# Patient Record
Sex: FEMALE | Race: OTHER | HISPANIC OR LATINO
[De-identification: names, ages, dates, MRNs, and addresses within clinical notes are randomized per-mention and may not be internally consistent; named-entity substitution may affect disease eponyms.]

---

## 2020-05-19 DIAGNOSIS — Z78.9 OTHER SPECIFIED HEALTH STATUS: ICD-10-CM

## 2020-05-20 ENCOUNTER — APPOINTMENT (OUTPATIENT)
Dept: BREAST CENTER | Facility: CLINIC | Age: 55
End: 2020-05-20
Payer: COMMERCIAL

## 2020-05-20 ENCOUNTER — APPOINTMENT (OUTPATIENT)
Dept: HEMATOLOGY ONCOLOGY | Facility: CLINIC | Age: 55
End: 2020-05-20
Payer: COMMERCIAL

## 2020-05-20 VITALS
BODY MASS INDEX: 25.23 KG/M2 | SYSTOLIC BLOOD PRESSURE: 122 MMHG | HEIGHT: 66 IN | WEIGHT: 157 LBS | OXYGEN SATURATION: 99 % | DIASTOLIC BLOOD PRESSURE: 81 MMHG | HEART RATE: 67 BPM | TEMPERATURE: 98.5 F | RESPIRATION RATE: 18 BRPM

## 2020-05-20 VITALS
SYSTOLIC BLOOD PRESSURE: 115 MMHG | DIASTOLIC BLOOD PRESSURE: 75 MMHG | WEIGHT: 154 LBS | BODY MASS INDEX: 24.75 KG/M2 | HEIGHT: 66 IN | HEART RATE: 75 BPM

## 2020-05-20 DIAGNOSIS — Z00.00 ENCOUNTER FOR GENERAL ADULT MEDICAL EXAMINATION W/OUT ABNORMAL FINDINGS: ICD-10-CM

## 2020-05-20 PROCEDURE — 99245 OFF/OP CONSLTJ NEW/EST HI 55: CPT

## 2020-05-20 PROCEDURE — 99205 OFFICE O/P NEW HI 60 MIN: CPT

## 2020-05-20 NOTE — CONSULT LETTER
[Dear  ___] : Dear  [unfilled], [( Thank you for referring [unfilled] for consultation for _____ )] : Thank you for referring [unfilled] for consultation for [unfilled] [Please see my note below.] : Please see my note below. [Consult Closing:] : Thank you very much for allowing me to participate in the care of this patient.  If you have any questions, please do not hesitate to contact me. [Sincerely,] : Sincerely, [FreeTextEntry3] : Alma Cottrell MS DO\par Breast Surgeon\par [facility] \[place] [address]\par  [DrKaren  ___] : Dr. PHAN

## 2020-05-20 NOTE — PHYSICAL EXAM
[Fully active, able to carry on all pre-disease performance without restriction] : Status 0 - Fully active, able to carry on all pre-disease performance without restriction [Normal] : affect appropriate [de-identified] : 3x3 cm painful mass in right breast in outer mid quadrant

## 2020-05-20 NOTE — PHYSICAL EXAM
[Normocephalic] : normocephalic [Atraumatic] : atraumatic [Supple] : supple [No Supraclavicular Adenopathy] : no supraclavicular adenopathy [Examined in the supine and seated position] : examined in the supine and seated position [No dominant masses] : no dominant masses left breast [No Nipple Retraction] : no left nipple retraction [No Nipple Discharge] : no left nipple discharge [No Axillary Lymphadenopathy] : no left axillary lymphadenopathy [No Edema] : no edema [No Rashes] : no rashes [No Ulceration] : no ulceration [Symmetrical] : symmetrical [Grade 2] : Ptosis Grade 2 [de-identified] : implant soft, in place, 9:00 in area of known malignancy there is a 2-3 cm firm mass, mobile, shiny skin texture but no skin involvement, superficial tumor, healed periareolar incision [de-identified] : implant soft, in place , no masses, no skin changes

## 2020-05-20 NOTE — HISTORY OF PRESENT ILLNESS
[FreeTextEntry1] : This is a 55 year old female referred by Dr. Shen for a newly diagnosed right breast cancer. She states she felt a lump since November 2019. It was painful with pressure.\par Her last imaging was 2 years ago in [place] and she doesn't remember where.She started garlic, oregano supplements, essro, vitamin e and super primose supplements since her diagnosis. She has bilateral retropectoral silicone implants which were placed in [place] 2011. \par \par On February 27, 2020 she presented for imaging for the right breast mass. In the right breast 9:00 a 2.6 cm macro lobulated hypoechoic nodule was seen which correlated with the palpable lump. Cystocele the left breast. On May 5, 2020 she presented for right breast core biopsy and right breast 9:00 6 cm (twirl clip) from the nipple revealed a infiltrating duct carcinoma grade 2 with necrosis and papillary features, estrogen 0%-negative, progesterone 0%-negative, HER-2-negative by FISH and IHC, Ki-67 90%. \par \par She does NOT perform SBE. \par She has not noticed a change in her breast or a breast lump on the left. She feels a right breast mass as above since november.\par She has not noticed a change in her nipple or nipple area.\par She has not noticed a change in the skin of the breast.\par She is not experiencing nipple discharge.\par She is experiencing right breast pain since her bx.\par She has not noticed a lump or lymph node under the armpit. \par \par BREAST CANCER RISK FACTORS\par Menarche: 17\par Date of LMP: 2013\par Menopause: post age 48\par Grav: 4     Para: 4\par Age at first live birth: 21\par Nursed: yes\par Hysterectomy: no\par Oophorectomy: no\par OCP: yes in the past \par HRT: no\par Last pap/pelvic exam: 2019 WNL \par Related family history: none\par Ashkenazi: no \par Mastery risk assessment: n/a\par BRCA testing: \par Bra size: \par \par Last mammogram: 2/27/2020                Location:  [facility]\par Report reviewed.                                 Images reviewed on PACS\par Results: BIRADS 4\par heterogeneously dense breasts. no evidence of malignancy.\par \par Last ultrasound: 2/272020                    Location: [facility]\par Report reviewed.                                 Images reviewed on PACS\par Results: BIRADS 4\par in the 9:00 right breast, 6 cm from the nipple, there is a 2.6cm microlobulated hypoechoic nodule with enhanced through transmission. internal vascularity is seen. this correlates with the palpable lump. in the 3:00 left breast, 3cm from the nipple there is a 3mm simple cyst. in the 10:00 left breast 2 cm from the nipple, there is a 6mm cluster of cysts. in the retroareolar left breast, there is a 3mm simple cyst. no other suspicious nodules or foci shadowing are identified.\par \par Last MRI:   never                                          Location:\par Report reviewed.\par \par

## 2020-05-20 NOTE — HISTORY OF PRESENT ILLNESS
[T: ___] : T[unfilled] [N: ___] : N[unfilled] [2 - Distress Level] : Distress Level: 2 [de-identified] : Patient feels well, good energy , denies any  sob or cough \par + back pain but this is chronic \par + breast pain continues  [de-identified] : This is a 56 y/o woman who is otherwise in excellent health, first noticed a "lump" in her breast back in november.  The mass has waxed and waned in size since then. \par The mass has become more painful,but no skin changes or nipple dc. Her last mammo was about 2 years ago. \par Mammogram done in feb 2020 showed a 2.6 cm mass.  Her diagnosis and biopsy was delayed due to covid. Biopsy done  may 5th showed a high grade triple negative ki 67 was 90 %. \par Patient has not had mri of breast or any systemic imaging or genetics \par Saw breast surgeon dr kumar earlier today. \par \par

## 2020-05-20 NOTE — ASSESSMENT
[FreeTextEntry1] : The pathology and imaging results were reviewed and discussed. She is a stage IIB right breast cancer (T2N0) triple negative(AJCC 8thed).\par \par Breast MRI is recommended for further evaluation of the extent of disease/possible presence of multicentric and/or contralateral disease. \par \par The use of multimodality therapy for the treatment of breast cancer was discussed.  \par Surgical options were reviewed including a lumpectomy to negative margins, with whole breast radiation therapy versus a mastectomy with or without reconstruction. Included in this discussion with the results of the NSABP-04 and 06 trials.\par \par  Mastectomy techniques were discussed in detail including skin sparing and nipple sparing techniques. Recurrence was reviewed and that mastectomy does not confer a 100% risk reduction nor guarantee against breast cancer in the future.\par \par Reconstructive options were briefly reviewed, including implant based versus tissue flap based reconstruction options.  Referral  to a plastic surgeon was offered.  The patient was informed that breast reconstruction is covered by insurance per state and federal laws.   \par \par Axillary staging was discussed. We reviewed the sentinel lymph node procedure, and how if the sentinel lymph node is found to have metastatic disease either on the intraoperative evaluation or on the final pathology, that an axillary dissection of the remaining lymph nodes may be recommended. It was explained that an axillary dissection takes "level one and level two" lymph nodes, and also "level three" if they feel clinically suspicious. It was explained that if the sentinel lymph node was not able to be found, that an axillary dissection may be necessary.  I reviewed the risks of lymphedema for SLND (6%) versus ALND (20%). I reviewed our lymphedema monitoring program.\par \par The Z-0011 study was touched upon, outlining that there is evidence that it may not be necessary to complete an axillary dissection in select patients even if one or two sentinel nodes are positive for cancer, as long as the cancer in the nodes is confined to within the nodes, the nodes aren't  matted down, and if the cancer meets certain criteria including being less than 5 cm, treated by lumpectomy and conventional whole breast radiation, and the patient is planning to take recommended adjuvant therapy, and didn't require preoperative chemotherapy.   \par \par The general indications for the use of adjuvant hormonal and chemotherapy and targeted therapies were discussed. It was explained that medical treatments are dependent on pathology results including receptor studies.  \par \par The indications for radiation therapy and side effects were also discussed including the use varying lengths of whole breast radiation.  It was explained that radiation is generally reserved for lumpectomy patients, and patients with larger tumors or positive lymph nodes. Common side effects were reviewed. \par \par The genetics of breast cancer were discussed with the implications for risk of contralateral cancer and other associated cancers, implication for ovarian risk, options for management, and implications for family members.\par She will get genetic testing today with Dr. Anderson.\par She met with our cancer navigator and was given a cancer binder.\par I will call her after MRI, 2nd op path and tumor board review.\par She knows to call or return sooner should any concerns or questions arise.\par Translation via language Line [employer]

## 2020-05-20 NOTE — ASSESSMENT
[FreeTextEntry1] : This is a 54 y/o woman with  new diagnosis of triple negative , high grade, breast cancer atleast T2Nx. \par \par 1) breast cancer \par -reviewed pathology and imaging \par -explained that the tumor is triple negative , meaning there is no option for targeted therapy and also this is a high grade tumor which is more aggressive and more likely to metastasize traditionally a worse prognosis \par -chemotherapy is indicated in adjuvant or neoadjuvant setting \par -there are several advantages to neoadjuvant chemotherapy in this setting - to assess in vivo sensativity , higher rate of negative margins, early systemic therapy to treat micro mets ,  and the ability to use xeloda in the event there is not a complete response \par -given the tumor is triple negative and well over 2 cm , I would recommend neoadjuvant chemo \par -discussed the need for genetic testing \par -patient will discuss surgical options with dr kumar \par -await mri of breasts \par -petct now to rule out mets \par -needs port and labs today , including markers \par -discussed AC--T , at length, reviewed the rationale , efficacy, and side effects at length \par including but not limited to - hair loss, nausea , constipation , diarrhea , fatigue, bone pain, cytopenias, infection , heart failure and secondary leukemias\par -if brca pos will do carboplatin as well with the taxol \par -discussed dignicap \par -discussed medical marijuana \par -plan for full antiemetic support and gcsf \par \par 2) cardiomyopathy \par get echo  before adriamycin \par \par 3) back pain \par get petct rule out mets \par \par follow up with petct results \par \par dw patient and son (vanessa) , discussed via  [place]ese intepreter \par time spent over 1.5 hour

## 2020-05-29 ENCOUNTER — APPOINTMENT (OUTPATIENT)
Dept: HEMATOLOGY ONCOLOGY | Facility: CLINIC | Age: 55
End: 2020-05-29
Payer: COMMERCIAL

## 2020-05-29 VITALS
HEIGHT: 66 IN | WEIGHT: 156 LBS | OXYGEN SATURATION: 98 % | DIASTOLIC BLOOD PRESSURE: 72 MMHG | BODY MASS INDEX: 25.07 KG/M2 | HEART RATE: 73 BPM | RESPIRATION RATE: 18 BRPM | SYSTOLIC BLOOD PRESSURE: 109 MMHG | TEMPERATURE: 98.2 F

## 2020-05-29 PROCEDURE — 99215 OFFICE O/P EST HI 40 MIN: CPT

## 2020-05-30 NOTE — PHYSICAL EXAM
[Fully active, able to carry on all pre-disease performance without restriction] : Status 0 - Fully active, able to carry on all pre-disease performance without restriction [Normal] : affect appropriate [de-identified] : 3x3 cm painful mass in right breast in outer mid quadrant

## 2020-05-30 NOTE — REASON FOR VISIT
[Follow-Up Visit] : a follow-up [FreeTextEntry2] : triple negative breast cancer here to review treatment plan

## 2020-05-30 NOTE — HISTORY OF PRESENT ILLNESS
[N: ___] : N[unfilled] [T: ___] : T[unfilled] [2 - Distress Level] : Distress Level: 2 [de-identified] : This is a 54 y/o woman who is otherwise in excellent health, first noticed a "lump" in her breast back in november.  The mass has waxed and waned in size since then. \par The mass has become more painful,but no skin changes or nipple dc. Her last mammo was about 2 years ago. \par Mammogram done in feb 2020 showed a 2.6 cm mass.  Her diagnosis and biopsy was delayed due to covid. Biopsy done  may 5th showed a high grade triple negative ki 67 was 90 %. \par Patient has not had mri of breast or any systemic imaging or genetics \par Saw breast surgeon dr kumar and MRI as well as neoadjuvant chemo recommended \par \par  [de-identified] : + breast pain \par hasn’t done petct yet , going for port and covid testing next week \par echo done may 2020 [facility] - normal EF \par mri of breast shows additional areas suspicious , going for ultrasound and bx  , if unable to see on ultrasound will need MRI guided bx \par 5/2020- markers negative, d is only 14, ferritin was 188, folate 14.7, b12 274\par cmp normal \par wants to do medical cannibis

## 2020-05-30 NOTE — ASSESSMENT
[FreeTextEntry1] : This is a 56 y/o woman with  new diagnosis of triple negative , high grade, breast cancer atleast T2Nx. \par \par 1) breast cancer \par -mri reviewed with dr berger, patient needs additional biopsy either by ultrasound if possible or by mri \par -port will be placed on monday \par -echo is normal \par -await petct \par - again reviewed dose dense AC--T , at length, explained rationale , efficacy, and side effects at length \par including but not limited to - hair loss, nausea , constipation , diarrhea , fatigue, bone pain, cytopenias, infection , heart failure and secondary leukemias\par -if brca pos will do carboplatin as well with the taxol \par -await genetic testing \par -covid testing before chemo and port \par -reviewed the prescribed antiemetics including zofran and compazine , discussed the need to call with refractory nausea and also constipation \par discussed the need to call with fever \par \par 2) cardiomyopathy \par echo is normal \par discused risk of cardiac failure \par \par 3) back pain \par petct pending \par \par 4) vit d def \par start vit d 2000 units a day \par \par 5) vit b12 def \par start b12 1000 \par \par dw patient and son (vanessa) , 5 0

## 2020-06-02 ENCOUNTER — APPOINTMENT (OUTPATIENT)
Dept: THORACIC SURGERY | Facility: HOSPITAL | Age: 55
End: 2020-06-02

## 2020-06-09 ENCOUNTER — APPOINTMENT (OUTPATIENT)
Dept: HEMATOLOGY ONCOLOGY | Facility: CLINIC | Age: 55
End: 2020-06-09
Payer: COMMERCIAL

## 2020-06-12 ENCOUNTER — APPOINTMENT (OUTPATIENT)
Dept: HEMATOLOGY ONCOLOGY | Facility: CLINIC | Age: 55
End: 2020-06-12
Payer: COMMERCIAL

## 2020-06-12 VITALS
WEIGHT: 151 LBS | DIASTOLIC BLOOD PRESSURE: 77 MMHG | HEIGHT: 66 IN | BODY MASS INDEX: 24.27 KG/M2 | OXYGEN SATURATION: 98 % | TEMPERATURE: 98.6 F | RESPIRATION RATE: 18 BRPM | HEART RATE: 85 BPM | SYSTOLIC BLOOD PRESSURE: 103 MMHG

## 2020-06-12 PROCEDURE — 99215 OFFICE O/P EST HI 40 MIN: CPT

## 2020-06-12 NOTE — ASSESSMENT
[FreeTextEntry1] : This is a 56 y/o woman with  new diagnosis of triple negative , high grade, breast cancer atleast T2Nx. \par \par 1) breast cancer \par -started neoadjuvant ddAC\par -overall tolerating well \par -discussed nausea , advised more aggressive use of prophylacitic antimetics \par -responding well to chemo \par -brca positive dw patient and son at length , will add carbo to taxol \par advised grover bso for brca positive , send ca 125 , will refer to gyn onc \par advised testing of siblings and children \par -decreased dose of chemo with next cycle due to severe side effects \par -labs today \par \par 2) cardiomyopathy \par echo is normal \par discused risk of cardiac failure \par \par 3) back pain \par petct neg \par \par 4) vit d def \par cont vit d 2000 units a day \par \par 5) vit b12 def \par cont  b12 1000 \par \par 6) nausea \par do zofran twice a day to prevent nausea \par starrt olanzepim for 3 days after chemo \par start ativan prn \par start marijuana for nausea as well \par \par 7) constipation \par cont senna start day before chemo \par \par \par \par dw patient and son (vanessa)  at length

## 2020-06-12 NOTE — REVIEW OF SYSTEMS
[Fatigue] : fatigue [Vomiting] : vomiting [Constipation] : constipation [FreeTextEntry9] : back pain  stable

## 2020-06-12 NOTE — HISTORY OF PRESENT ILLNESS
[de-identified] : This is a 54 y/o woman who is otherwise in excellent health, first noticed a "lump" in her breast back in november.  The mass has waxed and waned in size since then. \par The mass has become more painful,but no skin changes or nipple dc. Her last mammo was about 2 years ago. \par Mammogram done in feb 2020 showed a 2.6 cm mass.  Her diagnosis and biopsy was delayed due to covid. Biopsy done  may 5th showed a high grade triple negative ki 67 was 90 %. \par Patient has not had mri of breast or any systemic imaging or genetics \par Saw breast surgeon dr kumar and MRI as well as neoadjuvant chemo recommended \par \par Started ddAC  june 5, 2020 \par \par BRCA1 positve\par PETCT negative for mets \par echo is normal may 2020 \par \par biopsy of contralat breast is positive for triple negative breast \par \par \par  [de-identified] : started AC chemo last week , had severe nausea and vomtiing , also was extremely tired and didn’t want to get out of bed \par compazine did not help but zofran helped and caused headche \par also felt a little dizzy and light headed \par no fevers \par + constipation \par no bone pain \par + gerd

## 2020-06-12 NOTE — REASON FOR VISIT
[FreeTextEntry2] : triple negative breast cancer presents for toxicity check and clearence for chemo

## 2020-06-12 NOTE — PHYSICAL EXAM
[Restricted in physically strenuous activity but ambulatory and able to carry out work of a light or sedentary nature] : Status 1- Restricted in physically strenuous activity but ambulatory and able to carry out work of a light or sedentary nature, e.g., light house work, office work [de-identified] : 1.5 to 2 cm  l mass in right breast in outer mid quadrant , sig smaller compared to last viist

## 2020-06-26 ENCOUNTER — APPOINTMENT (OUTPATIENT)
Dept: HEMATOLOGY ONCOLOGY | Facility: CLINIC | Age: 55
End: 2020-06-26
Payer: MEDICAID

## 2020-06-26 VITALS
SYSTOLIC BLOOD PRESSURE: 107 MMHG | BODY MASS INDEX: 23.27 KG/M2 | HEIGHT: 66 IN | OXYGEN SATURATION: 100 % | WEIGHT: 144.8 LBS | HEART RATE: 109 BPM | RESPIRATION RATE: 17 BRPM | DIASTOLIC BLOOD PRESSURE: 67 MMHG

## 2020-06-26 PROCEDURE — 99214 OFFICE O/P EST MOD 30 MIN: CPT

## 2020-06-26 NOTE — PHYSICAL EXAM
[Restricted in physically strenuous activity but ambulatory and able to carry out work of a light or sedentary nature] : Status 1- Restricted in physically strenuous activity but ambulatory and able to carry out work of a light or sedentary nature, e.g., light house work, office work [Normal] : affect appropriate [de-identified] : mass is sig better measuring about 1.5 cm much flatter and smaller overall

## 2020-06-26 NOTE — HISTORY OF PRESENT ILLNESS
[T: ___] : T[unfilled] [N: ___] : N[unfilled] [de-identified] : This is a 54 y/o woman who is otherwise in excellent health, first noticed a "lump" in her breast back in november.  The mass has waxed and waned in size since then. \par The mass has become more painful,but no skin changes or nipple dc. Her last mammo was about 2 years ago. \par Mammogram done in feb 2020 showed a 2.6 cm mass.  Her diagnosis and biopsy was delayed due to covid. Biopsy done  may 5th showed a high grade triple negative ki 67 was 90 %. \par Patient has not had mri of breast or any systemic imaging or genetics \par Saw breast surgeon dr kumar and MRI as well as neoadjuvant chemo recommended \par \par Started ddAC  june 5, 2020 \par \par BRCA1 positve\par PETCT negative for mets \par echo is normal may 2020 \par \par biopsy of contralat breast is positive for triple negative breast \par \par  [de-identified] : feeling very tired and  nauseated.  better with dose 2 reduction \par zofran  every day, in between nothing \par very fatigued , needs to lay down \par less vomiting  but still having  a lot fatigue \par + mouth sore , needs mouth wash. \par improved mass \par constipation better this time \par chest pain better \par no pain in bones \par today eating better this time \par  [4 - Distress Level] : Distress Level: 4

## 2020-06-26 NOTE — ASSESSMENT
[FreeTextEntry1] : This is a 56 y/o woman with  new diagnosis of triple negative , high grade, breast cancer atleast T2Nx. \par \par 1) breast cancer \par -started neoadjuvant ddAC\par -due for dose 3 next week but patient wants to delay to following week (3 weeks instead of 2 bc of the holiday) \par -overall tolerating well \par -mass responding well to chemo \par -repeat labs today \par -cont antiemetics \par -proceed with cycle 3 of AC \par \par 2) cardiomyopathy \par echo is normal \par discused risk of cardiac failure \par \par 3) back pain \par petct neg \par \par 4) vit d def \par cont vit d 2000 units a day \par \par 5) vit b12 def \par cont  b12 1000 \par \par 6) nausea \par cont preventative zofran working well \par has ativan, compazine , olonzepine, and medical cannibis \par \par 7) constipation \par senna working well \par \par \par \par dw patient and son (vanessa)  at length \par follow up in 2 weeks for chemo \par 3 weeks md appt

## 2020-06-26 NOTE — REVIEW OF SYSTEMS
[Fatigue] : fatigue [Constipation] : constipation [Negative] : Psychiatric [FreeTextEntry4] : mouth sore  [FreeTextEntry7] : nausea

## 2020-07-17 ENCOUNTER — APPOINTMENT (OUTPATIENT)
Dept: HEMATOLOGY ONCOLOGY | Facility: CLINIC | Age: 55
End: 2020-07-17
Payer: MEDICAID

## 2020-07-17 VITALS
WEIGHT: 147 LBS | HEIGHT: 66 IN | SYSTOLIC BLOOD PRESSURE: 91 MMHG | HEART RATE: 96 BPM | TEMPERATURE: 98.3 F | OXYGEN SATURATION: 99 % | RESPIRATION RATE: 18 BRPM | BODY MASS INDEX: 23.63 KG/M2 | DIASTOLIC BLOOD PRESSURE: 64 MMHG

## 2020-07-17 PROCEDURE — 99214 OFFICE O/P EST MOD 30 MIN: CPT

## 2020-07-17 NOTE — ASSESSMENT
[FreeTextEntry1] : This is a 54 y/o woman with  new diagnosis of triple negative , high grade, breast cancer atleast T2Nx. \par \par 1) breast cancer \par -s/p 3 cycles of AC neoadjuvant chemo \par -overall tolerating well \par -responding well to chemo \par -repeat labs today \par -cont antiemetics \par -proceed with cycle 4 out of 4  of AC\par -discussed next chemotherapy with carbo  and taxol, reviewed schedule and risk including - n/v/, d/c, neuropathy and cytopenias  \par \par 2) cardiomyopathy \par echo is normal \par risk of cardiac failure  with AC, no evidence of heart issues \par \par 3) back pain \par petct neg \par \par 4) vit d def \par cont vit d 2000 units a day \par \par 5) vit b12 def \par cont  b12 1000 \par \par 6) nausea \par cont preventative zofran working well \par has ativan, compazine , olonzepine, and medical cannibis \par \par 7) constipation \par cont senna \par \par 8) mouth sores \par cont salt water rinses  \par \par \par \par dw patient and son (vanessa)  at length \par follow up in 1 weeks for chemo \par 2 weeks md appt

## 2020-07-17 NOTE — REASON FOR VISIT
[Follow-Up Visit] : a follow-up [FreeTextEntry2] : breast cancer here for follow up on chemotherapy

## 2020-07-17 NOTE — HISTORY OF PRESENT ILLNESS
[T: ___] : T[unfilled] [N: ___] : N[unfilled] [0 - No Distress] : Distress Level: 0 [de-identified] : This is a 54 y/o woman who is otherwise in excellent health, first noticed a "lump" in her breast back in november.  The mass has waxed and waned in size since then. \par The mass has become more painful,but no skin changes or nipple dc. Her last mammo was about 2 years ago. \par Mammogram done in feb 2020 showed a 2.6 cm mass.  Her diagnosis and biopsy was delayed due to covid. Biopsy done  may 5th showed a high grade triple negative ki 67 was 90 %. \par Patient has not had mri of breast or any systemic imaging or genetics \par Saw breast surgeon dr kumar and MRI as well as neoadjuvant chemo recommended \par \par Started ddAC  june 5, 2020 \par \par BRCA1 positve\par PETCT negative for mets \par echo is normal may 2020 \par \par biopsy of contralat breast is positive for triple negative breast \par \par  [de-identified] : tolerated cycle 3 , of AC\par only vomited once   , taking zofran as needed \par better on weds \par no constipation \par still feeling tired at times , efren with walking long distances with mask on

## 2020-07-17 NOTE — PHYSICAL EXAM
[Restricted in physically strenuous activity but ambulatory and able to carry out work of a light or sedentary nature] : Status 1- Restricted in physically strenuous activity but ambulatory and able to carry out work of a light or sedentary nature, e.g., light house work, office work [Normal] : grossly intact [de-identified] : mass is sig better measuring about 1 cm firm nodule , no adenopathy

## 2020-07-31 ENCOUNTER — APPOINTMENT (OUTPATIENT)
Dept: HEMATOLOGY ONCOLOGY | Facility: CLINIC | Age: 55
End: 2020-07-31
Payer: MEDICAID

## 2020-07-31 VITALS
HEIGHT: 66 IN | BODY MASS INDEX: 23.3 KG/M2 | DIASTOLIC BLOOD PRESSURE: 68 MMHG | OXYGEN SATURATION: 99 % | SYSTOLIC BLOOD PRESSURE: 100 MMHG | HEART RATE: 115 BPM | TEMPERATURE: 98.1 F | WEIGHT: 145 LBS

## 2020-07-31 PROCEDURE — 99214 OFFICE O/P EST MOD 30 MIN: CPT

## 2020-07-31 NOTE — REVIEW OF SYSTEMS
[Fatigue] : fatigue [Vomiting] : vomiting [Negative] : Heme/Lymph [FreeTextEntry2] : severe fatigue  [FreeTextEntry6] : SOB with exertion  [FreeTextEntry7] : severe nausea and vomtiing

## 2020-07-31 NOTE — HISTORY OF PRESENT ILLNESS
[N: ___] : N[unfilled] [T: ___] : T[unfilled] [0 - No Distress] : Distress Level: 0 [de-identified] : This is a 54 y/o woman who is otherwise in excellent health, first noticed a "lump" in her breast back in november.  The mass has waxed and waned in size since then. \par The mass has become more painful,but no skin changes or nipple dc. Her last mammo was about 2 years ago. \par Mammogram done in feb 2020 showed a 2.6 cm mass.  Her diagnosis and biopsy was delayed due to covid. Biopsy done  may 5th showed a high grade triple negative ki 67 was 90 %. \par Patient has not had mri of breast or any systemic imaging or genetics \par Saw breast surgeon dr kumar and MRI as well as neoadjuvant chemo recommended \par \par Started ddAC  june 5, 2020 \par \par BRCA1 positve\par PETCT negative for mets \par echo is normal may 2020 \par \par biopsy of contralat breast is positive for triple negative breast \par \par  [de-identified] : cycle 4 went really poor, felt very nausea and took zofran  multiple times with very little relief \par does not like cannibis \par worsening sore throat \par a lot of fatigue \par no constipation \par can not feel the mass in breast anymore \par \par

## 2020-07-31 NOTE — ASSESSMENT
[FreeTextEntry1] : This is a 54 y/o woman with  new diagnosis of triple negative , high grade, breast cancer atleast T2Nx. \par \par 1) breast cancer \par -s/p 4 cycles of AC neoadjuvant chemo \par -responding extremely well , will have follow up with surgery in 2 weeks \par -slowly recovering from AC , still having a lot of nausea and vomtiing \par -advise liberal use of zofran and compazine , also advise try ativan or olonzapem prescribed earlier \par -advise hydration \par -proceed with next chemo carbo and taxol next week \par -discussed risk of nausea, constipation, neuropathy , allergic reaction cytopenias and infection \par -reminded patient to take steroids night before chemo \par \par 2) cardiomyopathy \par echo is normal \par risk of cardiac failure  with AC, no evidence of heart issues \par \par 3) back pain \par petct neg \par \par 4) vit d def \par cont vit d 2000 units a day \par \par 5) vit b12 def \par cont  b12 1000 \par \par 6) nausea \par cont zofrran \par encouraged trial of  ativan, compazine , olonzepine, and medical cannibis  as needed \par \par 7) constipation \par imrpoved \par \par 8) mouth sores \par cont salt water rinses  \par start magic mouthwash \par \par \par send full panel of labs \par dw patient and son (vanessa)  at length \par follow up in 1 weeks for chemo and md appt to make sure nausea is improved

## 2020-07-31 NOTE — REASON FOR VISIT
[Follow-Up Visit] : a follow-up [FreeTextEntry2] : breast cancer here for follow up on chemotherapy , complains of nauea and vomiting

## 2020-07-31 NOTE — PHYSICAL EXAM
[Restricted in physically strenuous activity but ambulatory and able to carry out work of a light or sedentary nature] : Status 1- Restricted in physically strenuous activity but ambulatory and able to carry out work of a light or sedentary nature, e.g., light house work, office work [Normal] : affect appropriate [de-identified] : unable to feel mass in right breast at all

## 2020-08-07 ENCOUNTER — APPOINTMENT (OUTPATIENT)
Dept: HEMATOLOGY ONCOLOGY | Facility: CLINIC | Age: 55
End: 2020-08-07
Payer: MEDICAID

## 2020-08-07 VITALS
BODY MASS INDEX: 23.89 KG/M2 | SYSTOLIC BLOOD PRESSURE: 103 MMHG | WEIGHT: 148 LBS | DIASTOLIC BLOOD PRESSURE: 70 MMHG | HEART RATE: 121 BPM | TEMPERATURE: 98.5 F | OXYGEN SATURATION: 99 %

## 2020-08-07 PROCEDURE — 99214 OFFICE O/P EST MOD 30 MIN: CPT

## 2020-08-07 NOTE — HISTORY OF PRESENT ILLNESS
[T: ___] : T[unfilled] [N: ___] : N[unfilled] [0 - No Distress] : Distress Level: 0 [de-identified] : This is a 54 y/o woman who is otherwise in excellent health, first noticed a "lump" in her breast back in november.  The mass has waxed and waned in size since then. \par The mass has become more painful,but no skin changes or nipple dc. Her last mammo was about 2 years ago. \par Mammogram done in feb 2020 showed a 2.6 cm mass.  Her diagnosis and biopsy was delayed due to covid. Biopsy done  may 5th showed a high grade triple negative ki 67 was 90 %. \par Patient has not had mri of breast or any systemic imaging or genetics \par Saw breast surgeon dr kumar and MRI as well as neoadjuvant chemo recommended \par \par Started ddAC  june 5, 2020 \par \par BRCA1 positve\par PETCT negative for mets \par echo is normal may 2020 \par \par biopsy of contralat breast is positive for triple negative breast \par \par  [de-identified] : mucositis  sig , better \par fatigue better \par no palp or sob \par both breast sore, feels ok \par no constipation \par nausea last on tues . \par josé next week at [facility]   with ultrasound to follow up size \par \par

## 2020-08-07 NOTE — ASSESSMENT
[FreeTextEntry1] : This is a 54 y/o woman with  new diagnosis of triple negative , high grade, breast cancer atleast T2Nx. \par \par 1) breast cancer \par -s/p 4 cycles of AC neoadjuvant chemo \par -responding extremely well to chemotherapy \par -excelletn response clinically , will follow up with dr kumar and have repeat ultrasound next week \par if no imaging shows complete response may want to omit carboplatin from next phase of chemo \par -discussed next phaxse of chemotherapy -  taxol weekly with carbo every 3 weeks \par -discussed risks as before - nausea diarrhea/constipation, neuropathy and allergic reaction etc \par  - proceed with cycle 1 week 1 of carbo taxol \par -repeat cmp \par \par 2) cardiomyopathy \par echo is normal \par \par \par 3) back pain \par petct neg \par \par 4) vit d def \par cont vit d 2000 units a day \par \par 5) vit b12 def \par cont  b12 1000 \par \par 6) nausea \par cont zofrran \par encouraged trial of  ativan, compazine , olonzepine, and medical cannibis  as needed \par \par 7) constipation \par imrpoved \par \par 8) mouth sores \par cont salt water rinses  \par start magic mouthwash \par \par \par send full panel of labs \par dw patient and daughter   at length \par follow up in 1 weeks for chemo and md appt for clearence and tox check

## 2020-08-07 NOTE — REVIEW OF SYSTEMS
[Fatigue] : fatigue [Vomiting] : vomiting [Negative] : Allergic/Immunologic [FreeTextEntry6] : SOB improved  [FreeTextEntry7] : improved nausea   [FreeTextEntry2] : improved fatigue

## 2020-08-11 ENCOUNTER — APPOINTMENT (OUTPATIENT)
Dept: BREAST CENTER | Facility: CLINIC | Age: 55
End: 2020-08-11
Payer: MEDICAID

## 2020-08-11 VITALS
DIASTOLIC BLOOD PRESSURE: 74 MMHG | HEART RATE: 116 BPM | WEIGHT: 148 LBS | HEIGHT: 66 IN | SYSTOLIC BLOOD PRESSURE: 100 MMHG | BODY MASS INDEX: 23.78 KG/M2

## 2020-08-11 PROCEDURE — 99215 OFFICE O/P EST HI 40 MIN: CPT

## 2020-08-11 NOTE — ASSESSMENT
[FreeTextEntry1] : The pathology and imaging results were reviewed and discussed. She is a stage IIB right breast cancer (T2N0) triple negative(AJCC 8thed) and left stage IB (T1cN0), triple negative AJCC 8th ed.\par \par Breast MRI will be done at end of therapy. Ultrasound today shows no visualized mass left and smaller on right, report pending.\par \par The use of multimodality therapy for the treatment of breast cancer was discussed.  \par Surgical options were reviewed including a lumpectomy to negative margins, with whole breast radiation therapy versus a mastectomy with or without reconstruction. Included in this discussion with the results of the NSABP-04 and 06 trials.\par \par  Mastectomy techniques were discussed in detail including skin sparing and nipple sparing techniques. Recurrence was reviewed and that mastectomy does not confer a 100% risk reduction nor guarantee against breast cancer in the future.\par \par Reconstructive options were briefly reviewed, including implant based versus tissue flap based reconstruction options.  Referral  to a plastic surgeon was offered.  The patient was informed that breast reconstruction is covered by insurance per state and federal laws.   \par \par Axillary staging was discussed. We reviewed the sentinel lymph node procedure, and how if the sentinel lymph node is found to have metastatic disease either on the intraoperative evaluation or on the final pathology, that an axillary dissection of the remaining lymph nodes may be recommended. It was explained that an axillary dissection takes "level one and level two" lymph nodes, and also "level three" if they feel clinically suspicious. It was explained that if the sentinel lymph node was not able to be found, that an axillary dissection may be necessary.  I reviewed the risks of lymphedema for SLND (6%) versus ALND (20%). I reviewed our lymphedema monitoring program.\par \par The Z-0011 study was touched upon, outlining that there is evidence that it may not be necessary to complete an axillary dissection in select patients even if one or two sentinel nodes are positive for cancer, as long as the cancer in the nodes is confined to within the nodes, the nodes aren't  matted down, and if the cancer meets certain criteria including being less than 5 cm, treated by lumpectomy and conventional whole breast radiation, and the patient is planning to take recommended adjuvant therapy, and didn't require preoperative chemotherapy.   \par \par The general indications for the use of adjuvant hormonal and chemotherapy and targeted therapies were discussed. It was explained that medical treatments are dependent on pathology results including receptor studies.  \par \par The indications for radiation therapy and side effects were also discussed including the use varying lengths of whole breast radiation.  It was explained that radiation is generally reserved for lumpectomy patients, and patients with larger tumors or positive lymph nodes. Common side effects were reviewed. \par \par The genetics of breast cancer were discussed with the implications for risk of contralateral cancer and other associated cancers, implication for ovarian risk, options for management, and implications for family members.\par She will see genetic counselor 9/8/2020.\par She is motivated towards bilateral NSM and isn't sure about what kind of reconstruction she should have. She would like to see Dr. Lerman for consult. She will also see gynecology for BSO consult. Possible plan would be bilateral nipple delay, bilateral SLNE and BSO and then 2 weeks later to have bilateral NSM.\par f/u with me after MRI. \par She knows to call or return sooner should any concerns or questions arise.\par

## 2020-08-11 NOTE — CONSULT LETTER
[( Thank you for referring [unfilled] for consultation for _____ )] : Thank you for referring [unfilled] for consultation for [unfilled] [Dear  ___] : Dear  [unfilled], [Please see my note below.] : Please see my note below. [Consult Closing:] : Thank you very much for allowing me to participate in the care of this patient.  If you have any questions, please do not hesitate to contact me. [DrKaren  ___] : Dr. PHAN [Sincerely,] : Sincerely, [FreeTextEntry3] : Alma Cottrell MS DO\par Breast Surgeon\par [facility] \[place] [address]\par

## 2020-08-11 NOTE — PHYSICAL EXAM
[Normocephalic] : normocephalic [Atraumatic] : atraumatic [Supple] : supple [No Supraclavicular Adenopathy] : no supraclavicular adenopathy [Grade 2] : Ptosis Grade 2 [Examined in the supine and seated position] : examined in the supine and seated position [Symmetrical] : symmetrical [No dominant masses] : no dominant masses left breast [No Nipple Retraction] : no right nipple retraction [No Nipple Discharge] : no left nipple discharge [No Axillary Lymphadenopathy] : no left axillary lymphadenopathy [No Edema] : no edema [No Rashes] : no rashes [No Ulceration] : no ulceration [de-identified] : implant soft, in place, 9:00 in area of known malignancy there is no longer a 2-3 cm firm mass, mobile, shiny skin texture but no skin involvement, superficial tumor, healed periareolar incision [de-identified] : implant soft, in place , no masses, no skin changes

## 2020-08-11 NOTE — HISTORY OF PRESENT ILLNESS
[FreeTextEntry1] : This is a 55 year old female referred by Dr. Shen for a newly diagnosed right breast cancer. She states she felt a lump since November 2019. It was painful with pressure.\par Her last imaging was 2 years ago in [place] and she doesn't remember where.She started garlic, oregano supplements, essro, vitamin e and super primose supplements since her diagnosis. She has bilateral retropectoral silicone implants which were placed in [place] 2011. \par \par On February 27, 2020 she presented for imaging for the right breast mass. In the right breast 9:00 a 2.6 cm macro lobulated hypoechoic nodule was seen which correlated with the palpable lump. Cystocele the left breast. On May 5, 2020 she presented for right breast core biopsy and right breast 9:00 6 cm (twirl clip) from the nipple revealed a infiltrating duct carcinoma grade 2 with necrosis and papillary features, estrogen 0%-negative, progesterone 0%-negative, HER-2-negative by FISH and IHC, Ki-67 90%. \par \par She is s/p  4/4 AC. She 1/12 ( 8/7/2020)craft/taxol.  She is feeling well. Her only c/o today is being tired from chemo.\par \par She does NOT perform SBE. \par She has not noticed a change in her breast or a breast lump on the left. She feels a right breast mass as above since november.\par She has not noticed a change in her nipple or nipple area.\par She has not noticed a change in the skin of the breast.\par She is not experiencing nipple discharge.\par She is experiencing right breast pain since her bx.\par She has not noticed a lump or lymph node under the armpit. \par \par BREAST CANCER RISK FACTORS\par Menarche: 17\par Date of LMP: 2013\par Menopause: post age 48\par Grav: 4     Para: 4\par Age at first live birth: 21\par Nursed: yes\par Hysterectomy: no\par Oophorectomy: no\par OCP: yes in the past \par HRT: no\par Last pap/pelvic exam: 2019 WNL \par Related family history: none\par Ashkenazi: no \par Mastery risk assessment: n/a\par BRCA testing: \par Bra size: \par \par Last mammogram: 2/27/2020                Location:  [facility]\par Report reviewed.                                 Images reviewed on PACS\par Results: BIRADS 4\par heterogeneously dense breasts. no evidence of malignancy.\par \par Last ultrasound: 2/272020                    Location: [facility]\par Report reviewed.                                 Images reviewed on PACS\par Results: BIRADS 4\par in the 9:00 right breast, 6 cm from the nipple, there is a 2.6cm microlobulated hypoechoic nodule with enhanced through transmission. internal vascularity is seen. this correlates with the palpable lump. in the 3:00 left breast, 3cm from the nipple there is a 3mm simple cyst. in the 10:00 left breast 2 cm from the nipple, there is a 6mm cluster of cysts. in the retroareolar left breast, there is a 3mm simple cyst. no other suspicious nodules or foci shadowing are identified.\par \par Last MRI:   never                                          Location:\par Report reviewed.\par \par

## 2020-08-14 ENCOUNTER — APPOINTMENT (OUTPATIENT)
Dept: HEMATOLOGY ONCOLOGY | Facility: CLINIC | Age: 55
End: 2020-08-14
Payer: MEDICAID

## 2020-08-14 VITALS
TEMPERATURE: 98.6 F | RESPIRATION RATE: 18 BRPM | SYSTOLIC BLOOD PRESSURE: 110 MMHG | OXYGEN SATURATION: 99 % | HEART RATE: 118 BPM | HEIGHT: 66 IN | DIASTOLIC BLOOD PRESSURE: 75 MMHG

## 2020-08-14 PROCEDURE — 99214 OFFICE O/P EST MOD 30 MIN: CPT

## 2020-08-14 NOTE — HISTORY OF PRESENT ILLNESS
[de-identified] : This is a 54 y/o woman who is otherwise in excellent health, first noticed a "lump" in her breast back in november.  The mass has waxed and waned in size since then. \par The mass has become more painful,but no skin changes or nipple dc. Her last mammo was about 2 years ago. \par Mammogram done in feb 2020 showed a 2.6 cm mass.  Her diagnosis and biopsy was delayed due to covid. Biopsy done  may 5th showed a high grade triple negative ki 67 was 90 %. \par Patient has not had mri of breast or any systemic imaging or genetics \par Saw breast surgeon dr kumar and MRI as well as neoadjuvant chemo recommended \par \par Started ddAC  june 5, 2020 \par \par BRCA1 positve\par PETCT negative for mets \par echo is normal may 2020 \par \par biopsy of contralat breast is positive for triple negative breast \par \par  [de-identified] : started carboplatin and taxol so far , no allergic reaction \par more tired \par no nausea , no pills \par no constipation or diarrhea \par has appt with plastic and with gyn (no appt with rad onc yet ) \par numb  thingh  and feet  at times \par 8/11/20  ultrasound revealing for resolved  left mass and sig improved right breast mass )now 1.6 cm) \par

## 2020-08-14 NOTE — REVIEW OF SYSTEMS
[Vomiting] : no vomiting [de-identified] : some neuropathy beginning in feet  [FreeTextEntry2] : improved fatigue

## 2020-08-14 NOTE — ASSESSMENT
[FreeTextEntry1] : This is a 54 y/o woman with  new diagnosis of triple negative , high grade, breast cancer atleast T2Nx. \par \par 1) breast cancer \par -s/p 4 cycles of AC neoadjuvant chemo  now on carbo taxol \par -responding extremely well to chemotherapy \par -ultrasound shows sig improvement in both masses in left and right \par -tolerating chemotherapy well , sig improved side effects \par discussed the neuropathy will need to moniter and possibly reduce dose \par -decrease steroids night before chemo \par -proceed with taxol \par \par 2) cardiomyopathy \par echo is normal \par \par \par 3) back pain \par petct neg \par \par 4) vit d def \par cont vit d 2000 units a day \par \par 5) vit b12 def \par cont  b12 1000 \par \par 6) nausea \par sig better \par cont antiemetics prn \par \par 7) constipation \par imrpoved \par \par 8) mouth sores \par resolved \par \par 9) neuropathy \par likely due to chemo \par cont to moniter , decrease dose if cont to worsen \par \par \par \par dw patient and son    at length \par follow up in 1 weeks for chemo and md appt for clearence and tox check

## 2020-08-21 ENCOUNTER — APPOINTMENT (OUTPATIENT)
Dept: HEMATOLOGY ONCOLOGY | Facility: CLINIC | Age: 55
End: 2020-08-21
Payer: MEDICAID

## 2020-08-21 VITALS
OXYGEN SATURATION: 99 % | BODY MASS INDEX: 23.92 KG/M2 | WEIGHT: 148.2 LBS | HEART RATE: 127 BPM | TEMPERATURE: 98.1 F | DIASTOLIC BLOOD PRESSURE: 75 MMHG | SYSTOLIC BLOOD PRESSURE: 112 MMHG

## 2020-08-21 PROCEDURE — 99214 OFFICE O/P EST MOD 30 MIN: CPT

## 2020-08-21 NOTE — ASSESSMENT
[FreeTextEntry1] : This is a 56 y/o woman with  new diagnosis of triple negative , high grade, breast cancer atleast T2Nx. \par \par 1) breast cancer \par -s/p 4 cycles of AC neoadjuvant chemo  now on carbo taxol  shamika adjuvant \par -responding extremely well to chemotherapy \par -ultrasound shows sig improvement in both masses in left and right \par -cont to respond well and tolerating chemo well \par -proceeed with  cycle 1 week 3 of taxol , follow up next week for carbo taxol  cycle 2 \par dicussed will eliminate carbo if neuropathy worse  given such imprressive clinical reponse so far \par \par 2) cardiomyopathy \par echo is normal \par \par \par 3) back pain \par petct neg \par \par 4) vit d def \par cont vit d 2000 units a day \par \par 5) vit b12 def \par cont  b12 1000 \par \par 6) nausea \par improved \par cont antiemetics prn \par \par 7) constipation \par imrpoved \par \par 8) mouth sores \par resolved \par \par 9) neuropathy \par likely due to chemo \par cont to moniter , decrease dose if cont to worsen \par \par 10) anemia \par likley cause of fatigue and sob \par no need for transfusion \par restart b12 \par check anemia work up \par \par 11) insomnia \par start ativan prn \par \par dw patient and son    at length \par follow up one week for chemo 2 weeks MD appt

## 2020-08-21 NOTE — REASON FOR VISIT
[Follow-Up Visit] : a follow-up [FreeTextEntry2] : breast cancer here for follow up on chemotherapy , here for taxol

## 2020-08-21 NOTE — REVIEW OF SYSTEMS
[Fatigue] : fatigue [Negative] : Heme/Lymph [Vomiting] : no vomiting [FreeTextEntry2] : still fatigued but improved  [de-identified] : some neuropathy beginning in feet , stable this week

## 2020-08-21 NOTE — HISTORY OF PRESENT ILLNESS
[T: ___] : T[unfilled] [N: ___] : N[unfilled] [0 - No Distress] : Distress Level: 0 [Cycle: ___] : Cycle: [unfilled] [de-identified] : This is a 54 y/o woman who is otherwise in excellent health, first noticed a "lump" in her breast back in november.  The mass has waxed and waned in size since then. \par The mass has become more painful,but no skin changes or nipple dc. Her last mammo was about 2 years ago. \par Mammogram done in feb 2020 showed a 2.6 cm mass.  Her diagnosis and biopsy was delayed due to covid. Biopsy done  may 5th showed a high grade triple negative ki 67 was 90 %. \par Patient has not had mri of breast or any systemic imaging or genetics \par Saw breast surgeon dr kumar and MRI as well as neoadjuvant chemo recommended \par \par Started ddAC  june 5, 2020 \par \par BRCA1 positve\par PETCT negative for mets \par echo is normal may 2020 \par \par biopsy of contralat breast is positive for triple negative breast \par \par 08/14/2020\par started carboplatin and taxol so far , no allergic reaction \par more tired \par no nausea , no pills \par no constipation or diarrhea \par has appt with plastic and with gyn (no appt with rad onc yet ) \par numb  thingh  and feet  at times \par 8/11/20  ultrasound revealing for resolved  left mass and sig improved right breast mass )now 1.6 cm) \par \par  [de-identified] : \par Continues to have numbness in the lateral right thigh, is intermittent, worse with lying supine, resolves with movement and walking\par Continues to have fatigue (worse earlier in the week) , fatigue and JORDAN with activity and hypersalivation\par No RLE weakness, has chronic right sided low back pain\par No urinary or stool incontinence\par No numbness or tingling in hands or feet, no rashes\par Continues to have difficulty sleeping, tried Benadryl on 3 occasions\par No further nausea or vomiting, has not needed Zofran\par No further headaches for 3 weeks\par Plastics scheduled for 9/24/2020 (Dr Cleaning)\par GYN - not scheduled yet\par

## 2020-08-24 ENCOUNTER — APPOINTMENT (OUTPATIENT)
Dept: THORACIC SURGERY | Facility: HOSPITAL | Age: 55
End: 2020-08-24

## 2020-08-28 ENCOUNTER — APPOINTMENT (OUTPATIENT)
Dept: HEMATOLOGY ONCOLOGY | Facility: CLINIC | Age: 55
End: 2020-08-28
Payer: MEDICAID

## 2020-08-28 DIAGNOSIS — M79.646 PAIN IN UNSPECIFIED FINGER(S): ICD-10-CM

## 2020-08-28 PROCEDURE — 99213 OFFICE O/P EST LOW 20 MIN: CPT

## 2020-09-04 ENCOUNTER — APPOINTMENT (OUTPATIENT)
Dept: HEMATOLOGY ONCOLOGY | Facility: CLINIC | Age: 55
End: 2020-09-04
Payer: MEDICAID

## 2020-09-04 VITALS
DIASTOLIC BLOOD PRESSURE: 75 MMHG | TEMPERATURE: 98.1 F | WEIGHT: 145 LBS | OXYGEN SATURATION: 97 % | SYSTOLIC BLOOD PRESSURE: 111 MMHG | BODY MASS INDEX: 23.4 KG/M2 | HEART RATE: 110 BPM

## 2020-09-04 PROCEDURE — 99214 OFFICE O/P EST MOD 30 MIN: CPT

## 2020-09-04 NOTE — ASSESSMENT
[FreeTextEntry1] : This is a 56 y/o woman with  new diagnosis of triple negative , high grade, breast cancer at least T2Nx. \par \par 1) breast cancer \par -s/p 4 cycles of AC neoadjuvant chemo  now s/p carbo taxol  one cycle , now on taxol alone\par -responding extremely well to chemotherapy \par -ultrasound shows sig improvement in both masses in left and right \par -cont to respond well and tolerating chemo well \par -s/p   cycle 2 week 1 of taxol, carbo held for neuropathy and anemia \par -tolerating very well \par -cont to respond very well \par -proceed with week 2 of taxol , cycle2 \par \par 2) cardiomyopathy \par echo is normal \par \par \par 3) back pain \par petct neg \par \par 4) vit d def \par cont vit d 2000 units a day \par \par 5) vit b12 def \par cont  b12\par \par 6) nausea \par improved \par cont antiemetics prn \par \par 7) constipation \par improved \par \par 8) mouth sores \par resolved \par \par 9) neuropathy \par likely due to chemo \par cont to monitor \par hold carbo , cont taxol \par \par 10) anemia \par likely cause of fatigue and sob \par improved today \par cont to moniter , now off carboplatin \par \par 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

## 2020-09-04 NOTE — PHYSICAL EXAM
[Restricted in physically strenuous activity but ambulatory and able to carry out work of a light or sedentary nature] : Status 1- Restricted in physically strenuous activity but ambulatory and able to carry out work of a light or sedentary nature, e.g., light house work, office work [Normal] : grossly intact [de-identified] : no mass in right breast on exam  [de-identified] : hyperpigmentation of finger and toe nail beds. Tenderness of fingernail beds, not of toe nail beds.

## 2020-09-04 NOTE — REVIEW OF SYSTEMS
[Fatigue] : fatigue [Negative] : Allergic/Immunologic [Vomiting] : no vomiting [FreeTextEntry2] : + fatigue  [de-identified] : some neuropathy hands bilat , not in feet , stable this week

## 2020-09-04 NOTE — HISTORY OF PRESENT ILLNESS
[T: ___] : T[unfilled] [N: ___] : N[unfilled] [Cycle: ___] : Cycle: [unfilled] [0 - No Distress] : Distress Level: 0 [de-identified] : This is a 56 y/o woman who is otherwise in excellent health, first noticed a "lump" in her breast back in November.  The mass has waxed and waned in size since then. \par The mass has become more painful,but no skin changes or nipple dc. Her last mammo was about 2 years ago. \par Mammogram done in feb 2020 showed a 2.6 cm mass.  Her diagnosis and biopsy was delayed due to covid. Biopsy done  may 5th showed a high grade triple negative ki 67 was 90 %. \par Patient has not had mri of breast or any systemic imaging or genetics \par Saw breast surgeon dr kumar and MRI as well as neoadjuvant chemo recommended \par \par Started ddAC  june 5, 2020 \par \par BRCA1 positive\par PETCT negative for mets \par echo is normal may 2020 \par \par biopsy of contralat breast is positive for triple negative breast \par \par 08/14/2020\par started carboplatin and taxol so far , no allergic reaction \par \par 8/11/20  ultrasound revealing for resolved  left mass and sig improved right breast mass )now 1.6 cm) \par \par \par  [de-identified] : last week carbo held but started cycle 2 of weekly taxol \par still very tired , sensative fingertips , still havin trouble with ziplpock bag \par feet ok - walking ok \par breathing better now \par moving bowels ok \par eating much better \par

## 2020-09-18 ENCOUNTER — APPOINTMENT (OUTPATIENT)
Dept: HEMATOLOGY ONCOLOGY | Facility: CLINIC | Age: 55
End: 2020-09-18
Payer: MEDICAID

## 2020-09-18 VITALS
DIASTOLIC BLOOD PRESSURE: 72 MMHG | TEMPERATURE: 98.2 F | SYSTOLIC BLOOD PRESSURE: 115 MMHG | OXYGEN SATURATION: 98 % | HEART RATE: 113 BPM | BODY MASS INDEX: 23.7 KG/M2 | WEIGHT: 147.5 LBS | HEIGHT: 66 IN | RESPIRATION RATE: 18 BRPM

## 2020-09-18 PROCEDURE — 99214 OFFICE O/P EST MOD 30 MIN: CPT

## 2020-09-18 NOTE — PHYSICAL EXAM
[Restricted in physically strenuous activity but ambulatory and able to carry out work of a light or sedentary nature] : Status 1- Restricted in physically strenuous activity but ambulatory and able to carry out work of a light or sedentary nature, e.g., light house work, office work [Normal] : affect appropriate [de-identified] : no mass in right breast  or axilla on exam  [de-identified] : hyperpigmentation of finger and toe nail beds. Tenderness of fingernail beds [de-identified] : neuropathy stable

## 2020-09-18 NOTE — REASON FOR VISIT
[Follow-Up Visit] : a follow-up [FreeTextEntry2] : breast cancer here for follow up on chemotherapy , here for taxol & carbo

## 2020-09-18 NOTE — HISTORY OF PRESENT ILLNESS
[N: ___] : N[unfilled] [T: ___] : T[unfilled] [Cycle: ___] : Cycle: [unfilled] [0 - No Distress] : Distress Level: 0 [de-identified] : This is a 54 y/o woman who is otherwise in excellent health, first noticed a "lump" in her breast back in November.  The mass has waxed and waned in size since then. \par The mass has become more painful,but no skin changes or nipple dc. Her last mammo was about 2 years ago. \par Mammogram done in feb 2020 showed a 2.6 cm mass.  Her diagnosis and biopsy was delayed due to covid. Biopsy done  may 5th showed a high grade triple negative ki 67 was 90 %. \par Patient has not had mri of breast or any systemic imaging or genetics \par Saw breast surgeon dr kumar and MRI as well as neoadjuvant chemo recommended \par \par Started ddAC  june 5, 2020 \par \par BRCA1 positive\par PETCT negative for mets \par echo is normal may 2020 \par \par biopsy of contralat breast is positive for triple negative breast \par \par 08/14/2020\par started carboplatin and taxol so far , no allergic reaction \par more tired \par no nausea , no pills \par no constipation or diarrhea \par has appt with plastic and with gyn (no appt with rad onc yet ) \par numb  thingh  and feet  at times \par 8/11/20  ultrasound revealing for resolved  left mass and sig improved right breast mass )now 1.6 cm) \par \par Continues to have numbness in the lateral right thigh, is intermittent, worse with lying supine, resolves with movement and walking\par Continues to have fatigue (worse earlier in the week) , fatigue and JORDAN with activity and hypersalivation\par No RLE weakness, has chronic right sided low back pain\par No urinary or stool incontinence\par No numbness or tingling in hands or feet, no rashes\par Continues to have difficulty sleeping, tried Benadryl on 3 occasions\par No further nausea or vomiting, has not needed Zofran\par No further headaches for 3 weeks\par Plastics scheduled for 9/24/2020 (Dr Cleaning)\par GYN - not scheduled yet\par  [de-identified] : \par Continues to have numbness in the lateral right thigh, is intermittent, worse with lying supine, resolves with movement and walking - no change from previous\par Continues to have fatigue (worse earlier in the week) , fatigue and JORDAN with activity\par No RLE weakness, has chronic right sided low back pain\par No urinary or stool incontinence\par No numbness or tingling in hands or feet, no rashes\par Continues to have difficulty sleeping, tried Benadryl on 3 occasions\par No further nausea or vomiting, has not needed Zofran\par No further headaches for 4 weeks\par Plastics scheduled for 9/24/2020 (Dr Cleaning)\par GYN - not scheduled yet\par \par Pt seen today with new complaint of painful fingernails she describes as a tenderness with any pressure on her fingernails particularly the tips of her fingernail such as when opening a Ziploc bag for example.  She also has this coloration of her fingernail beds she has discoloration of her toenail beds as well but no pain of her toenails.  No pain in her toes the tips of her toes her fingers or fingertips and no numbness or tingling in the tips of fingers hands or tips of toes or feet.  She has no changes in perception of sensation in her hands and feet and no changes in perception of temperature in her hands or feet.  The pain in her fingernails is 5 out of 10 on pain scale at its worst and it only occurs when there is pressure on the fingernails at rest there is no pain.  She is tried Tylenol for the discomfort but it does not take away the discomfort when she is actively doing something with her fingernails or applying pressure on her fingernails.  There is no finger hand foot or toe pain at night.  No difficulty with buttons, earrings or other fine motor movements.

## 2020-09-18 NOTE — HISTORY OF PRESENT ILLNESS
[T: ___] : T[unfilled] [N: ___] : N[unfilled] [1 - Distress Level] : Distress Level: 1 [de-identified] : This is a 54 y/o woman who is otherwise in excellent health, first noticed a "lump" in her breast back in November.  The mass has waxed and waned in size since then. \par The mass has become more painful,but no skin changes or nipple dc. Her last mammo was about 2 years ago. \par Mammogram done in feb 2020 showed a 2.6 cm mass.  Her diagnosis and biopsy was delayed due to covid. Biopsy done  may 5th showed a high grade triple negative ki 67 was 90 %. 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 , (only received one dose) now on taxol weekly alone  [de-identified] : Feeling well \par only mild neuropathy now , nails are llifting a bit, no drainage \par no constipation or nausea \par has appt with breast reconstructive surgeon \par complain of mouth sores

## 2020-09-18 NOTE — PHYSICAL EXAM
[Restricted in physically strenuous activity but ambulatory and able to carry out work of a light or sedentary nature] : Status 1- Restricted in physically strenuous activity but ambulatory and able to carry out work of a light or sedentary nature, e.g., light house work, office work [Normal] : grossly intact [de-identified] : hyperpigmentation of finger and toe nail beds. Tenderness of fingernail beds, not of toe nail beds.

## 2020-09-18 NOTE — ASSESSMENT
[FreeTextEntry1] : This is a 56 y/o woman with  new diagnosis of triple negative , high grade, breast cancer at least T2Nx. \par \par 1) breast cancer \par -s/p 4 cycles of AC neoadjuvant chemo  now on carbo taxol  shamika adjuvant \par -responding extremely well to chemotherapy \par -ultrasound shows sig improvement in both masses in left and right \par -cont to respond well and tolerating chemo well \par -proceed with  cycle 2 week 1 of taxol hold carbo today per Dr Gonzalez due to pain syndrome of hands/ neuropathy (and declining Hbg)  see below. \par follow up next week for carbo  taxol  cycle 2 week 2 will eliminate carbo if neuropathy/pain persists  given such impressive clinical response so far \par \par 2) cardiomyopathy \par echo is normal \par \par \par 3) back pain \par petct neg \par \par 4) vit d def \par cont vit d 2000 units a day \par \par 5) vit b12 def \par cont  b12 1000 \par \par 6) nausea \par improved \par cont antiemetics prn \par \par 7) constipation \par improved \par \par 8) mouth sores \par resolved \par \par 9) neuropathy \par likely due to chemo \par cont to monitor , decrease dose if cont to worsen \par \par 10) anemia \par likely cause of fatigue and sob \par no need for transfusion, consider though if anemia progresses or if more symptomatic\par continue b12 \par check anemia work up \par hold carbo today (per Dr Gonzalez)\par \par 11) Pain syndrome of nail beds\par Hold carbo\par Tylenol prn\par Re-assess in 1 week\par Unclear if it is related to neuropathy\par \par 11) insomnia \par start Ativan prn \par \par dw patient and son    at length \par follow up one week for chemo and MD appt

## 2020-09-18 NOTE — ASSESSMENT
[FreeTextEntry1] : This is a 54 y/o woman with  new diagnosis of triple negative , high grade, breast cancer at least T2Nx. \par \par 1) breast cancer \par -s/p 4 cycles of AC neoadjuvant chemo  now s/p carbo taxol  one cycle , now on taxol alone\par -responding extremely well to chemotherapy \par -ultrasound shows sig improvement in both masses in left and right \par -cont to respond well and tolerating chemo well \par -neuropathy is stable \par -proceed with week  cycle 3 week one \par \par 2) cardiomyopathy \par echo is normal \par \par \par 3) back pain \par petct neg \par \par 4) vit d def \par cont vit d 2000 units a day \par \par 5) vit b12 def \par cont  b12\par \par 6) nausea \par improved \par cont antiemetics prn \par \par 7) constipation \par improved \par \par 8) mouth sores \par cont magic mouthwash \par \par 9) neuropathy \par likely due to chemo \par cont to monitor \par hold carbo , cont taxol \par \par 10) anemia \par likely cause of fatigue and sob \par improving \par cont to moniter , now off carboplatin \par \par 11) Pain syndrome of nail beds\par stable  now \par holding carbo cont taxol \par \par 11) insomnia \par start Ativan prn \par \par dw patient and son vanessa at length \par proceed with cycle 3 day 1 , next week chemo only , follow up md on week 3 of cycle 3

## 2020-09-18 NOTE — REVIEW OF SYSTEMS
[Fatigue] : fatigue [Negative] : Heme/Lymph [Vomiting] : no vomiting [FreeTextEntry2] : still fatigued but improved  [de-identified] : some neuropathy beginning in feet , stable this week

## 2020-09-18 NOTE — REVIEW OF SYSTEMS
[Fatigue] : fatigue [Negative] : Allergic/Immunologic [FreeTextEntry2] : improved fatigue  [FreeTextEntry4] : mouth sores  [FreeTextEntry9] : nails llfting up right hand mostly  [de-identified] : neuropathy stable

## 2020-09-18 NOTE — REASON FOR VISIT
[Follow-Up Visit] : a follow-up [FreeTextEntry2] : here for follow up of breast cancer , on chemotherapy

## 2020-09-24 ENCOUNTER — APPOINTMENT (OUTPATIENT)
Dept: PLASTIC SURGERY | Facility: CLINIC | Age: 55
End: 2020-09-24
Payer: MEDICAID

## 2020-09-24 ENCOUNTER — RESULT REVIEW (OUTPATIENT)
Age: 55
End: 2020-09-24

## 2020-09-24 PROCEDURE — 99205 OFFICE O/P NEW HI 60 MIN: CPT

## 2020-09-24 NOTE — HISTORY OF PRESENT ILLNESS
[FreeTextEntry1] : This is a 55 year old female referred by Dr. Cottrell for consultation to discuss breast reconstruction at the time of B/L mastectomy. She has a new diagnoses of right breast cancer and started neoadjuvant chemotherapy. She originally felt a lump in November 2019. Her diagnosis and biopsy was delayed due to covid. Biopsy done  may 5th showed a high grade triple negative ki 67 was 90 %.  She has bilateral retropectoral silicone implants which were placed in [place] 2011. Now has genetic testing and is BRCA1 positive. PETCT negative for mets. Biopsy of contralat breast is positive for triple negative breast.  8/11/20  ultrasound revealing for resolved  left mass and sig improved right breast mass )now 1.6 cm). Carbo dc due to rapidly progressive neuropathy , (only received one dose) now on taxol weekly alone \par \par On February 27, 2020 she presented for imaging for the right breast mass. In the right breast 9:00 a 2.6 cm macro lobulated hypoechoic nodule was seen which correlated with the palpable lump. Cystocele the left breast. On May 5, 2020 she presented for right breast core biopsy and right breast 9:00 6 cm (twirl clip) from the nipple revealed a infiltrating duct carcinoma grade 2 with necrosis and papillary features, estrogen 0%-negative, progesterone 0%-negative, HER-2-negative by FISH and IHC, Ki-67 90%. \par \par BREAST CANCER RISK FACTORS\par Menarche: 17\par Date of LMP: 2013\par Menopause: post age 48\par Grav: 4     Para: 4\par Age at first live birth: 21\par Nursed: yes\par Hysterectomy: no\par Oophorectomy: no\par OCP: yes in the past \par HRT: no\par Last pap/pelvic exam: 2019 WNL \par Related family history: none\par Ashkenazi: no \par Mastery risk assessment: n/a

## 2020-09-24 NOTE — HISTORY OF PRESENT ILLNESS
[FreeTextEntry1] : This is a 55 year old female currently undergoing neoadjuvant chemotherapy invasive ductal carcinoma of the right breast. She is currently in 8/12 cycles. She presents today to discuss immediate breast  reconstruction  and she is interested in HERNANDEZ flap reconstruction. Her previous breast history is positive for bilateral breast augmentation in 2010.

## 2020-09-24 NOTE — REVIEW OF SYSTEMS
[Breast Pain] : breast pain [Breast Lump] : breast lump [Negative] : Heme/Lymph [de-identified] : Neuropathy from Carboplatin

## 2020-09-24 NOTE — PHYSICAL EXAM
[NI] : Normal [de-identified] : B/L breast implants in place, Cap Con Grade II B/L, periareolar incision along lower border 7 o'clock --> 9 o'clock - widened, glandular ptosis (waterfall deformity),  [de-identified] : Soft NT, ND, minimal SubQ adiposity and skin laxity, well healed Lap Port sites. Not enough tissue to reapproximate size of her current breasts.

## 2020-09-24 NOTE — ASSESSMENT
[FreeTextEntry1] : I reviewed with Ms. FERRARO  in detail the risks, benefits, and alternatives of both implant based breast reconstruction as well as autologous tissue reconstruction. She will likely need adjuvant radiation. \par Specifically, I explained to her that an implant reconstruction usually consists of two separate stages with two surgeries spaced about 4 months apart. At the time of the mastectomy, a tissue expander is placed underneath the pectoralis muscle or on top of the pectoralis muscle and is often reinforced with biologic mesh - acellular dermal matrix. We expand the tissue expander secondarily in the office by injecting saline into the implant percutaneously until the desired size breast mound is achieved. At that point a second stage operation is scheduled where we take her back to operating room and remove the tissue expander and place a permanent breast implant.  The permanent prosthesis can be either silicone or saline.  The first stage of the reconstruction is approximately 3 hours for one breast and 4-5 hours for a bilateral procedure, with a 1-2 night hospital stay and a four-week recovery.  The second stage surgery is an outpatient procedure with a two-week recovery. I reviewed with her the risks of implant reconstruction including but not limited to bleeding, scarring, implant infection, implant rupture, capsule contracture, implant malposition, asymmetry, contour abnormality, and need for revision surgeries. I also discussed the FDA recommendations for silicone implant rupture screening with MRI. \par \par I then reviewed with DENISE  the details of autologous tissue reconstruction, specifically using a free flap from her lower abdomen.  This operation entails transplanting the skin, the fat, and blood vessels from the lower abdomen up to the chest wall where we reattach the artery and vein to blood vessels on the chest wall behind the rib to re-vascularize the tissue called a ‘flap’ utilizing microsurgical techniques. We attempt to save all of the muscle fibers of the abdominal rectus muscle to minimize the chance of an abdominal wall weakness, bulge or hernia and only transfer the perforating blood vessels.  This is called a HERNANDEZ flap.  I explained to her the scar burden associated with this operation including the scars on the breasts and the lower abdomen and around the umbilicus.  I explained to her that there is a risk of free flap loss and vessel thrombosis requiring a return to the operating room for emergent exploration in an attempt to salvage the flap.  If we do lose a flap do to vascular compromise, we would likely place a tissue expander at the time of her returning to the operating room in order to preserve the breast skin envelope and perform a delayed reconstruction.  I reviewed the risks of abdominal wall morbidity including but not limited to abdominal wall weakness, hernia, or bulge. \par \par The HERNANDEZ flap is designed to minimize the risk of abdominal wall morbidity as opposed to a TRAM flap that cuts the abdominal wall muscle, but even a HERNANDEZ flap has a small chance of abdominal wall bulge, weakness or hernia.  This operation is approximately 6 hours for one side and 9 hours for a bilateral procedure with a three day hospitalization and a six-week recovery period. I also explained that there is a possibility of contour abnormality, asymmetry between the two breasts, and possible need for revision surgery. A surgery on the native contralateral breast to achieve symmetry in the setting of a unilateral mastectomy is usually required and often performed at the time of the implant exchange or nipple reconstruction. The nipple areolar reconstruction is performed at a later date as a separate procedure. We may also perform immediate [id number] nerve repair with axogen nerve interposition graft to treat postmastectomy hypesthesia and pain syndrome.\par \par

## 2020-10-02 ENCOUNTER — APPOINTMENT (OUTPATIENT)
Dept: HEMATOLOGY ONCOLOGY | Facility: CLINIC | Age: 55
End: 2020-10-02
Payer: MEDICAID

## 2020-10-02 VITALS
HEIGHT: 66 IN | RESPIRATION RATE: 18 BRPM | SYSTOLIC BLOOD PRESSURE: 110 MMHG | HEART RATE: 95 BPM | TEMPERATURE: 98.5 F | BODY MASS INDEX: 23.63 KG/M2 | OXYGEN SATURATION: 98 % | WEIGHT: 147 LBS | DIASTOLIC BLOOD PRESSURE: 80 MMHG

## 2020-10-02 PROCEDURE — 99214 OFFICE O/P EST MOD 30 MIN: CPT

## 2020-10-02 NOTE — ASSESSMENT
[FreeTextEntry1] : This is a 56 y/o woman with  new diagnosis of triple negative , high grade, breast cancer at least T2Nx. \par \par 1) breast cancer \par -s/p 4 cycles of AC neoadjuvant chemo  now s/p carbo taxol  one cycle , now on taxol alone\par -responding extremely well to chemotherapy \par -ultrasound shows sig improvement in both masses in left and right \par -cont to respond well and tolerating chemo well \par -neuropathy seems worse \par -decrease dose of taxol \par -proceed with cycle 9 \par \par 2) cardiomyopathy \par echo is normal \par \par 3) back pain \par petct neg \par \par 4) vit d def \par cont vit d 2000 units a day \par \par 5) vit b12 def \par cont  b12\par \par 6) nausea \par improved \par cont antiemetics prn \par \par 7) constipation \par improved \par \par 8) mouth sores \par cont magic mouthwash \par \par 9) neuropathy \par likely due to chemo \par cont to monitor \par hold carbo , cont taxol \par \par 10) anemia \par likely cause of fatigue and sob \par improving \par cont to moniter , now off carboplatin \par \par 11) Pain syndrome of nail beds\par stable  now \par holding carbo cont taxol \par \par 11) insomnia \par start Ativan prn \par \par proceed with dose 9 of taxol \par follow up  in one week for chemo and 2 weeks MD

## 2020-10-02 NOTE — REVIEW OF SYSTEMS
[Fatigue] : fatigue [Negative] : Allergic/Immunologic [FreeTextEntry2] : improved fatigue  [FreeTextEntry4] : mouth sores  still have on back of tongue  [FreeTextEntry9] : nails llfting up right hand mostly  [de-identified] : neuropathy stable

## 2020-10-02 NOTE — HISTORY OF PRESENT ILLNESS
[T: ___] : T[unfilled] [N: ___] : N[unfilled] [1 - Distress Level] : Distress Level: 1 [de-identified] : This is a 56 y/o woman who is otherwise in excellent health, first noticed a "lump" in her breast back in November.  The mass has waxed and waned in size since then. \par The mass has become more painful,but no skin changes or nipple dc. Her last mammo was about 2 years ago. \par Mammogram done in feb 2020 showed a 2.6 cm mass.  Her diagnosis and biopsy was delayed due to covid. Biopsy done  may 5th showed a high grade triple negative ki 67 was 90 %. 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 , (only received one dose) now on taxol weekly alone  [de-identified] : occasional pain on left side but now gone only lasted a few days \par message feet better circulation \par numbness in hands today is ok \par shaky hands \par sob better nose runny at times \par will be seeing dr sanchez for second opiinion  for plastics \par today is ultrasound , saw gyn dr [place] \par \par

## 2020-10-02 NOTE — PHYSICAL EXAM
[Restricted in physically strenuous activity but ambulatory and able to carry out work of a light or sedentary nature] : Status 1- Restricted in physically strenuous activity but ambulatory and able to carry out work of a light or sedentary nature, e.g., light house work, office work [Normal] : affect appropriate [de-identified] : no mass in right breast  or axilla on exam  [de-identified] : hyperpigmentation of finger and toe nail beds. Tenderness of fingernail beds [de-identified] : neuropathy stable

## 2020-10-09 ENCOUNTER — APPOINTMENT (OUTPATIENT)
Dept: PLASTIC SURGERY | Facility: CLINIC | Age: 55
End: 2020-10-09
Payer: MEDICAID

## 2020-10-09 VITALS
TEMPERATURE: 98.5 F | HEART RATE: 88 BPM | HEIGHT: 66 IN | WEIGHT: 145 LBS | SYSTOLIC BLOOD PRESSURE: 121 MMHG | OXYGEN SATURATION: 100 % | RESPIRATION RATE: 18 BRPM | BODY MASS INDEX: 23.3 KG/M2 | DIASTOLIC BLOOD PRESSURE: 86 MMHG

## 2020-10-09 VITALS
TEMPERATURE: 98 F | DIASTOLIC BLOOD PRESSURE: 70 MMHG | SYSTOLIC BLOOD PRESSURE: 120 MMHG | HEIGHT: 66 IN | RESPIRATION RATE: 16 BRPM | BODY MASS INDEX: 24.75 KG/M2 | WEIGHT: 154 LBS | HEART RATE: 72 BPM

## 2020-10-09 PROCEDURE — 99214 OFFICE O/P EST MOD 30 MIN: CPT

## 2020-10-09 NOTE — PHYSICAL EXAM
[NI] : Normal [de-identified] : Bilateral implants in place\par SN-N 25 cm bilateral\par no palpable masses \par no palpable adenopathy

## 2020-10-09 NOTE — REASON FOR VISIT
[Consultation] : a consultation visit [FreeTextEntry1] : Patient with breast carcinoma scheduled for bilateral mastectomy with Dr. Cottrell referred for a second opinion regarding reconstructive options

## 2020-10-09 NOTE — ASSESSMENT
[FreeTextEntry1] : A:\par Patient scheduled for bilateral mastectomy following shamika adjuvant chemotherapy and is good candidate fro reconstruction with expander / allograft or microvascular reconstruction.  We discussed the options at some length and favor reconstruction with expander / implant and allograft.  Reviewed the material risks,  benefits,  and alternatives with Ms. DENISE FERRARO  , including no surgery, and she  understands and wishes to proceed.\par

## 2020-10-09 NOTE — HISTORY OF PRESENT ILLNESS
[FreeTextEntry1] : Patient has a history of bilateral breast augmentation with implants in [place], size and position unknown to patient.  She would like to be smaller in size and is aware of reconstructive option of expander implant with allograft as well as HERNANDEZ flap reconstruction.  \par She is a 55 year old female referred by Dr. Shen for a newly diagnosed right breast cancer. She states she felt a lump since November 2019. It was painful with pressure.\par Her last imaging was 2 years ago in [place] and she doesn't remember where.She started garlic, oregano supplements, essro, vitamin e and super primose supplements since her diagnosis. She has bilateral retropectoral silicone implants which were placed in [place] 2011. \par \par On February 27, 2020 she presented for imaging for the right breast mass. In the right breast 9:00 a 2.6 cm macro lobulated hypoechoic nodule was seen which correlated with the palpable lump. Cystocele the left breast. On May 5, 2020 she presented for right breast core biopsy and right breast 9:00 6 cm (twirl clip) from the nipple revealed a infiltrating duct carcinoma grade 2 with necrosis and papillary features, estrogen 0%-negative, progesterone 0%-negative, HER-2-negative by FISH and IHC, Ki-67 90%. \par \par She does NOT perform SBE. \par She has not noticed a change in her breast or a breast lump on the left. She feels a right breast mass as above since november.\par She has not noticed a change in her nipple or nipple area.\par She has not noticed a change in the skin of the breast.\par She is not experiencing nipple discharge.\par She is experiencing right breast pain since her bx.\par She has not noticed a lump or lymph node under the armpit. \par \par BREAST CANCER RISK FACTORS\par Menarche: 17\par Date of LMP: 2013\par Menopause: post age 48\par Grav: 4 Para: 4\par Age at first live birth: 21\par Nursed: yes\par Hysterectomy: no\par Oophorectomy: no\par OCP: yes in the past \par HRT: no\par Last pap/pelvic exam: 2019 WNL \par Related family history: none\par Ashkenazi: no

## 2020-10-16 ENCOUNTER — APPOINTMENT (OUTPATIENT)
Dept: HEMATOLOGY ONCOLOGY | Facility: CLINIC | Age: 55
End: 2020-10-16
Payer: MEDICAID

## 2020-10-16 VITALS
SYSTOLIC BLOOD PRESSURE: 122 MMHG | BODY MASS INDEX: 23.63 KG/M2 | HEART RATE: 92 BPM | OXYGEN SATURATION: 98 % | WEIGHT: 147 LBS | DIASTOLIC BLOOD PRESSURE: 86 MMHG | TEMPERATURE: 98.5 F | HEIGHT: 66 IN | RESPIRATION RATE: 18 BRPM

## 2020-10-16 PROCEDURE — 99214 OFFICE O/P EST MOD 30 MIN: CPT

## 2020-10-16 NOTE — ASSESSMENT
[FreeTextEntry1] : This is a 56 y/o woman with  new diagnosis of triple negative , high grade, breast cancer at least T2Nx. \par \par 1) breast cancer \par -s/p 4 cycles of AC neoadjuvant chemo  now s/p carbo taxol  one cycle , now on taxol alone\par -responding extremely well to chemotherapy \par -ultrasound shows sig improvement in both masses in left and right \par -cont to respond well and tolerating chemo well \par -neuropathy continues , repeat b12 level \par -cont reduced dose of taxol \par -proceed with cycle 11 \par may need to hold next week if neuropathy worse \par \par 2) cardiomyopathy \par echo is normal \par \par 3) back pain \par petct neg \par \par 4) vit d def \par cont vit d 2000 units a day \par \par 5) vit b12 def \par cont  b12\par \par 6) nausea \par improved \par cont antiemetics prn \par \par 7) constipation \par improved , cont bowel regimen \par \par 8) mouth sores \par imrpoved \par cont magic mouthwash \par \par 9) neuropathy \par likely due to chemo \par cont to monitor \par repeat b12 level today \par hold carbo , cont taxol \par \par 10) anemia \par likely cause of fatigue and sob \par improving \par cont to moniter , now off carboplatin \par \par \par 11) insomnia \par start Ativan prn \par \par proceed with dose 11  of taxol \par follow up  in one week to deciede if doing last dose of taxol based on neuropathy follow up  MD \par dw patient and son at length

## 2020-10-16 NOTE — PHYSICAL EXAM
[Restricted in physically strenuous activity but ambulatory and able to carry out work of a light or sedentary nature] : Status 1- Restricted in physically strenuous activity but ambulatory and able to carry out work of a light or sedentary nature, e.g., light house work, office work [Normal] : affect appropriate [de-identified] : neuropathy worsening  [de-identified] : no mass in right breast  or axilla on exam

## 2020-10-23 ENCOUNTER — APPOINTMENT (OUTPATIENT)
Dept: HEMATOLOGY ONCOLOGY | Facility: CLINIC | Age: 55
End: 2020-10-23
Payer: MEDICAID

## 2020-10-23 VITALS
HEIGHT: 66 IN | TEMPERATURE: 98.5 F | BODY MASS INDEX: 23.7 KG/M2 | RESPIRATION RATE: 18 BRPM | DIASTOLIC BLOOD PRESSURE: 77 MMHG | OXYGEN SATURATION: 96 % | HEART RATE: 109 BPM | SYSTOLIC BLOOD PRESSURE: 114 MMHG | WEIGHT: 147.44 LBS

## 2020-10-23 DIAGNOSIS — M79.89 OTHER SPECIFIED SOFT TISSUE DISORDERS: ICD-10-CM

## 2020-10-23 DIAGNOSIS — Z51.11 ENCOUNTER FOR ANTINEOPLASTIC CHEMOTHERAPY: ICD-10-CM

## 2020-10-23 PROCEDURE — 99214 OFFICE O/P EST MOD 30 MIN: CPT

## 2020-10-23 PROCEDURE — 99072 ADDL SUPL MATRL&STAF TM PHE: CPT

## 2020-10-23 NOTE — PHYSICAL EXAM
[Restricted in physically strenuous activity but ambulatory and able to carry out work of a light or sedentary nature] : Status 1- Restricted in physically strenuous activity but ambulatory and able to carry out work of a light or sedentary nature, e.g., light house work, office work [Normal] : affect appropriate [de-identified] : neuropathy

## 2020-10-23 NOTE — HISTORY OF PRESENT ILLNESS
[T: ___] : T[unfilled] [N: ___] : N[unfilled] [de-identified] : This is a 54 y/o woman who is otherwise in excellent health, first noticed a "lump" in her breast back in November.  The mass has waxed and waned in size since then. \par The mass has become more painful,but no skin changes or nipple dc. Her last mammo was about 2 years ago. \par Mammogram done in feb 2020 showed a 2.6 cm mass.  Her diagnosis and biopsy was delayed due to covid. Biopsy done  may 5th showed a high grade triple negative ki 67 was 90 %. 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 , (only received one dose) now on taxol weekly alone  [de-identified] : feeling well overall \par + neuropathy in hands stable , none in feet \par no sob or chest pain \par no constipation or nausea \par \par ultrasound ovaries ok \par will plan to do hystectomy after complete breast surgery \par \par

## 2020-10-23 NOTE — ASSESSMENT
[FreeTextEntry1] : This is a 56 y/o woman with  new diagnosis of triple negative , high grade, breast cancer at least T2Nx. \par \par 1) breast cancer \par -s/p 4 cycles of AC neoadjuvant chemo  now s/p carbo taxol  one cycle , now on taxol alone\par -responding extremely well to chemotherapy \par -ultrasound shows sig improvement in both masses in left and right \par -cont to respond well and tolerating chemo well \par -neuropathy continues , repeat b12 level \par -cont reduced dose of taxol \par -proceed witih cycle 12 of taxol \par -surgery planned for 1 month \par \par 2) cardiomyopathy \par echo is normal \par \par 3) back pain \par petct neg \par \par 4) vit d def \par cont vit d 2000 units a day \par \par 5) vit b12 def \par cont  b12\par \par 6) nausea \par improved \par cont antiemetics prn \par \par 7) constipation \par improved , cont bowel regimen \par \par 8) mouth sores \par imrpoved \par cont magic mouthwash \par \par 9) neuropathy \par likely due to chemo \par cont to monitor \par start alpha lipoic acid \par completes taxol today \par \par 10) anemia \par likely cause of fatigue and sob \par improving \par cont to moniter , last dose chemo today \par \par \par 11) insomnia \par Ativan prn \par \par proceed with dose 12   of taxol \par follow up labs in 2 weeks \par surgery in one month \par will review pathology and determined if will do xeloda post surgery

## 2020-10-23 NOTE — REVIEW OF SYSTEMS
[Fatigue] : fatigue [Negative] : Allergic/Immunologic [de-identified] : + neuropathy stable from last week

## 2020-10-28 ENCOUNTER — APPOINTMENT (OUTPATIENT)
Dept: BREAST CENTER | Facility: CLINIC | Age: 55
End: 2020-10-28
Payer: MEDICAID

## 2020-10-28 VITALS
DIASTOLIC BLOOD PRESSURE: 69 MMHG | WEIGHT: 147 LBS | BODY MASS INDEX: 23.63 KG/M2 | HEART RATE: 104 BPM | SYSTOLIC BLOOD PRESSURE: 107 MMHG | HEIGHT: 66 IN

## 2020-10-28 PROCEDURE — 93702 BIS XTRACELL FLUID ANALYSIS: CPT

## 2020-10-28 PROCEDURE — 99072 ADDL SUPL MATRL&STAF TM PHE: CPT

## 2020-11-04 ENCOUNTER — NON-APPOINTMENT (OUTPATIENT)
Age: 55
End: 2020-11-04

## 2020-11-05 ENCOUNTER — APPOINTMENT (OUTPATIENT)
Dept: BREAST CENTER | Facility: HOSPITAL | Age: 55
End: 2020-11-05

## 2020-11-06 ENCOUNTER — APPOINTMENT (OUTPATIENT)
Dept: HEMATOLOGY ONCOLOGY | Facility: CLINIC | Age: 55
End: 2020-11-06
Payer: MEDICAID

## 2020-11-06 VITALS
TEMPERATURE: 98 F | WEIGHT: 148 LBS | BODY MASS INDEX: 23.78 KG/M2 | SYSTOLIC BLOOD PRESSURE: 101 MMHG | HEART RATE: 98 BPM | DIASTOLIC BLOOD PRESSURE: 70 MMHG | HEIGHT: 66 IN | RESPIRATION RATE: 18 BRPM | OXYGEN SATURATION: 97 %

## 2020-11-06 PROCEDURE — 99214 OFFICE O/P EST MOD 30 MIN: CPT

## 2020-11-06 PROCEDURE — 99072 ADDL SUPL MATRL&STAF TM PHE: CPT

## 2020-11-06 NOTE — HISTORY OF PRESENT ILLNESS
[T: ___] : T[unfilled] [N: ___] : N[unfilled] [de-identified] : This is a 56 y/o woman who is otherwise in excellent health, first noticed a "lump" in her breast back in November.  The mass has waxed and waned in size since then. \par The mass has become more painful,but no skin changes or nipple dc. Her last mammo was about 2 years ago. \par Mammogram done in feb 2020 showed a 2.6 cm mass.  Her diagnosis and biopsy was delayed due to covid. Biopsy done  may 5th showed a high grade triple negative ki 67 was 90 %. 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 , (only received one dose) now on taxol weekly alone  [de-identified] : feeling well overall , energy is good\par used exercise bike yesterday for 1 hour\par no SOB, JORDAN or chest\par hair hair regrowth which she is pleased about\par + neuropathy in hands better, numbness in toes better, no pain, did not start ALA \par no constipation or nausea , no other GI complaints\par MRI reviewed shows iresolution on the left and on the right there is improvement in mass but persistant enhancement to pectoralis and extednign to nipple \par \par Bilateral mastectomy with implant reconstruction scheduled for 11/19/2020 with Rhoda Cottrell and Dionrah\par \par ultrasound ovaries ok \par will plan to do hysterotomy after complete breast surgery \par \par

## 2020-11-06 NOTE — ASSESSMENT
[FreeTextEntry1] : This is a 56 y/o woman with  new diagnosis of triple negative , high grade, breast cancer at least T2Nx. \par \par 1) breast cancer \par -s/p 4 cycles of AC neoadjuvant chemo  now s/p carbo taxol  one cycle , completed 12 doses of taxol alone oct [id number] \par -mri   nov 2020 shows sig improvement in both masses in left and right  but persistant enhancement on right with extension to pectoralis and to nipple \par -dw patient and son, could be residual DCIS and not invasive cancer  but is possible there is residual cancer which would be a poor prognositic sign. There is no way to know until we get the patholgy from surgeical resection, spoke with dr kumar she is hoping there will be a plane  between tumor and pectoralis but may need to remove some of the muscle, furthermore may need to consider rt post op \par -overall responded very well clinically to chemo \par -will determine need for adjuvant therapy with xeloda post op if there is invasive cancer left  on path \par -counts have recovered very well from chemo  ok to proceed with surgery - bilateral mastecotmy \par \par 2) cardiomyopathy \par echo is normal \par \par 3) back pain \par petct neg \par \par 4) vit d def \par cont vit d 2000 units a day \par \par 5) vit b12 def \par cont  b12\par \par 6) nausea \par improved \par cont antiemetics prn \par \par 7) constipation \par improved , cont bowel regimen \par \par 8) mouth sores \par imrpoved \par cont magic mouthwash \par \par 9) neuropathy \par likely due to chemo \par imrpoved now \par \par 10) anemia \par likely cause of fatigue and sob \par improved \par \par 11) insomnia \par Ativan prn \par \par follow up 2-3 weeks post surgery to review pathology \par dw patient and son at length

## 2020-11-06 NOTE — PHYSICAL EXAM
[Restricted in physically strenuous activity but ambulatory and able to carry out work of a light or sedentary nature] : Status 1- Restricted in physically strenuous activity but ambulatory and able to carry out work of a light or sedentary nature, e.g., light house work, office work [Normal] : affect appropriate [de-identified] : neuropathy

## 2020-11-06 NOTE — REVIEW OF SYSTEMS
[Fatigue] : fatigue [Negative] : Allergic/Immunologic [FreeTextEntry2] : sig improved fatigue  [de-identified] : + neuropathy better from two weeks ago

## 2020-11-06 NOTE — REASON FOR VISIT
[Follow-Up Visit] : a follow-up [FreeTextEntry2] : breast cancer, s/p Taxol #12, two weeks ago, here for review of mri

## 2020-11-13 ENCOUNTER — APPOINTMENT (OUTPATIENT)
Dept: BREAST CENTER | Facility: CLINIC | Age: 55
End: 2020-11-13

## 2020-11-19 ENCOUNTER — APPOINTMENT (OUTPATIENT)
Dept: PLASTIC SURGERY | Facility: CLINIC | Age: 55
End: 2020-11-19
Payer: MEDICAID

## 2020-11-19 ENCOUNTER — APPOINTMENT (OUTPATIENT)
Dept: BREAST CENTER | Facility: HOSPITAL | Age: 55
End: 2020-11-19
Payer: MEDICAID

## 2020-11-19 PROCEDURE — 15777 ACELLULAR DERM MATRIX IMPLT: CPT | Mod: 50,59

## 2020-11-19 PROCEDURE — 38900 IO MAP OF SENT LYMPH NODE: CPT | Mod: 50,59

## 2020-11-19 PROCEDURE — 38740 REMOVE ARMPIT LYMPH NODES: CPT | Mod: 50,59

## 2020-11-19 PROCEDURE — 19357 TISS XPNDR PLMT BRST RCNSTJ: CPT | Mod: 50

## 2020-11-19 PROCEDURE — 38525 BIOPSY/REMOVAL LYMPH NODES: CPT | Mod: 50,59

## 2020-11-19 PROCEDURE — 38792 RA TRACER ID OF SENTINL NODE: CPT | Mod: 50,59

## 2020-11-19 PROCEDURE — 76098 X-RAY EXAM SURGICAL SPECIMEN: CPT | Mod: 26

## 2020-11-19 PROCEDURE — 19303 MAST SIMPLE COMPLETE: CPT | Mod: 50

## 2020-11-19 NOTE — CONSULT LETTER
[Dear  ___] : Dear  [unfilled], [( Thank you for referring [unfilled] for consultation for _____ )] : Thank you for referring [unfilled] for consultation for [unfilled] [Please see my note below.] : Please see my note below. [Consult Closing:] : Thank you very much for allowing me to participate in the care of this patient.  If you have any questions, please do not hesitate to contact me. [Sincerely,] : Sincerely, [DrKaren  ___] : Dr. PHAN [FreeTextEntry3] : lAma Cottrell MS DO\par Breast Surgeon\par [facility] \[place] [address]\par

## 2020-11-19 NOTE — ASSESSMENT
[FreeTextEntry1] : The pathology and imaging results were reviewed and discussed. She is a stage IIB right breast cancer (T2N0) triple negative(AJCC 8thed) and left stage IB (T1cN0), triple negative AJCC 8th ed.\par \par Breast MRI will be done.\par \par The genetics of breast cancer were discussed with the implications for risk of contralateral cancer and other associated cancers, implication for ovarian risk, options for management, and implications for family members.\par She will see genetic counselor after surgery.\par \par She is motivated towards bilateral NSM and isn't sure about what kind of reconstruction she should have. She would like to see Dr. Lerman for consult. She will also see gynecology for BSO consult. \par f/u with me after MRI. \par She knows to call or return sooner should any concerns or questions arise.\par

## 2020-11-19 NOTE — PHYSICAL EXAM
[Normocephalic] : normocephalic [Atraumatic] : atraumatic [Supple] : supple [No Supraclavicular Adenopathy] : no supraclavicular adenopathy [Examined in the supine and seated position] : examined in the supine and seated position [Symmetrical] : symmetrical [Grade 2] : Ptosis Grade 2 [No dominant masses] : no dominant masses left breast [No Nipple Retraction] : no left nipple retraction [No Nipple Discharge] : no left nipple discharge [No Axillary Lymphadenopathy] : no left axillary lymphadenopathy [No Edema] : no edema [No Rashes] : no rashes [No Ulceration] : no ulceration [de-identified] : implant soft, in place, 9:00 in area of known malignancy there is no mass,  no skin changes, healed periareolar incision [de-identified] : implant soft, in place , no masses, no skin changes, healed periareolar incision

## 2020-11-19 NOTE — HISTORY OF PRESENT ILLNESS
[FreeTextEntry1] : This is a 55 year old female referred by Dr. Shen for a newly diagnosed right breast cancer. She states she felt a lump since November 2019. It was painful with pressure.\par Her last imaging was 2 years ago in [place] and she doesn't remember where.She started garlic, oregano supplements, essro, vitamin e and super primose supplements since her diagnosis. She has bilateral retropectoral silicone implants which were placed in [place] 2011. \par \par On February 27, 2020 she presented for imaging for the right breast mass. In the right breast 9:00 a 2.6 cm macro lobulated hypoechoic nodule was seen which correlated with the palpable lump. Cystocele the left breast. On May 5, 2020 she presented for right breast core biopsy and right breast 9:00 6 cm (twirl clip) from the nipple revealed a infiltrating duct carcinoma grade 2 (2nd op path at [facility] felt it was grade 3) with necrosis and papillary features, estrogen 0%-negative, progesterone 0%-negative, HER-2-negative by FISH and IHC, Ki-67 90%. \par \par She is s/p right axillary USGBx 6/1/2020 showed reactive lymph node and left breast 4N6 USGbx showed IDC, grade 9/9, TNBC Ki67 90%.  She completed chemo (ddACT) carboplatin was discontinued due to neuropathy 10/23/2020. She is feeling well. She presents today for surgical consultation with her daughter and son was on speakerphone.\par \par She does NOT perform SBE. \par She has not noticed a change in her breast or a breast lump on the left. She feels a right breast mass as above since november.\par She has not noticed a change in her nipple or nipple area.\par She has not noticed a change in the skin of the breast.\par She is not experiencing nipple discharge.\par She is experiencing right breast pain since her bx.\par She has not noticed a lump or lymph node under the armpit. \par \par BREAST CANCER RISK FACTORS\par Menarche: 17\par Date of LMP: 2013\par Menopause: post age 48\par Grav: 4     Para: 4\par Age at first live birth: 21\par Nursed: yes\par Hysterectomy: no\par Oophorectomy: no\par OCP: yes in the past \par HRT: no\par Last pap/pelvic exam: 2019 WNL \par Related family history: none\par Ashkenazi: no \par Mastery risk assessment: n/a\par BRCA testing: BRCA1 positive\par Bra size: \par \par Last mammogram: 2/27/2020                Location:  [facility]\par Report reviewed.                                 Images reviewed on PACS\par Results: BIRADS 4\par heterogeneously dense breasts. no evidence of malignancy.\par \par Last ultrasound: 8/11/2020                    Location: \par Report reviewed.                                 Images reviewed on PACS\par Results: BIRADS 6\par \par \par Last MRI: 11/3/2020                                 Location: [facility]\par Report reviewed.\par Results:\par central outer right breast enhancement 3.7cmx1.9cmx2.5cm (was 4.6x3.0x4.8cm) spiculation/NME extends to nipple and posterior to the capsule and pectoralis. \par Left: susceptibility artifact from a biopsy marker in LOQ previously seen enhancing mass no longer visualized. BIRADS 6\par \par

## 2020-11-25 ENCOUNTER — APPOINTMENT (OUTPATIENT)
Dept: PLASTIC SURGERY | Facility: CLINIC | Age: 55
End: 2020-11-25
Payer: MEDICAID

## 2020-11-25 VITALS
SYSTOLIC BLOOD PRESSURE: 123 MMHG | TEMPERATURE: 97.7 F | HEART RATE: 80 BPM | DIASTOLIC BLOOD PRESSURE: 81 MMHG | OXYGEN SATURATION: 97 % | RESPIRATION RATE: 20 BRPM

## 2020-11-25 PROCEDURE — 99024 POSTOP FOLLOW-UP VISIT: CPT

## 2020-11-25 NOTE — ASSESSMENT
[FreeTextEntry1] : A:\par Doing well post bilateral prepectoral reconstruction with expanders and allograft\par P:\par Drains 1 and 4 removed\par following a sterile prep and using the magnet for guidance, 50 cc sterile saline injected for new total of 250/500 on each side\par Patient tolerated well\par instructions reviewed with her and her son ( and caregiver)

## 2020-11-25 NOTE — REASON FOR VISIT
[Post Op: _________] : a [unfilled] post op visit [FreeTextEntry1] : Ms. DENISE FERRARO returns for a follow up visit, generally doing well with no complaints and no fevers or chills at home. drains 1 (right) and 4 (left) less than 20 cc per 24 hours

## 2020-11-25 NOTE — PHYSICAL EXAM
[NI] : Normal [de-identified] : expanders in place\par flaps viable no collection\par incisions are clean and intact

## 2020-11-30 ENCOUNTER — APPOINTMENT (OUTPATIENT)
Dept: BREAST CENTER | Facility: CLINIC | Age: 55
End: 2020-11-30
Payer: MEDICAID

## 2020-11-30 PROCEDURE — 99024 POSTOP FOLLOW-UP VISIT: CPT

## 2020-11-30 NOTE — HISTORY OF PRESENT ILLNESS
[FreeTextEntry1] : This is a 55 year old female referred by Dr. Shen for a newly diagnosed right breast cancer. She states she felt a lump since November 2019. It was painful with pressure.\par Her last imaging was 2 years ago in [place] and she doesn't remember where.She started garlic, oregano supplements, essro, vitamin e and super primose supplements since her diagnosis. She has bilateral retropectoral silicone implants which were placed in [place] 2011. \par \par On February 27, 2020 she presented for imaging for the right breast mass. In the right breast 9:00 a 2.6 cm macro lobulated hypoechoic nodule was seen which correlated with the palpable lump. Cystocele the left breast. On May 5, 2020 she presented for right breast core biopsy and right breast 9:00 6 cm (twirl clip) from the nipple revealed a infiltrating duct carcinoma grade 2 (2nd op path at [facility] felt it was grade 3) with necrosis and papillary features, estrogen 0%-negative, progesterone 0%-negative, HER-2-negative by FISH and IHC, Ki-67 90%. \par \par She is s/p right axillary USGBx 6/1/2020 showed reactive lymph node and left breast 4N6 USGbx showed IDC, grade 9/9, TNBC Ki67 90%.  She completed chemo (ddACT) carboplatin was discontinued due to neuropathy 10/23/2020. She is s/p bilateral SSM and SLNE and no residual cancer; right 3 lymph nodes removed and left 2 all negative.\par \par She does NOT perform SBE. \par She has not noticed a change in her breast or a breast lump on the left. She feels a right breast mass as above since november.\par She has not noticed a change in her nipple or nipple area.\par She has not noticed a change in the skin of the breast.\par She is not experiencing nipple discharge.\par She is experiencing right breast pain since her bx.\par She has not noticed a lump or lymph node under the armpit. \par \par BREAST CANCER RISK FACTORS\par Menarche: 17\par Date of LMP: 2013\par Menopause: post age 48\par Grav: 4     Para: 4\par Age at first live birth: 21\par Nursed: yes\par Hysterectomy: no\par Oophorectomy: no\par OCP: yes in the past \par HRT: no\par Last pap/pelvic exam: 2019 WNL \par Related family history: none\par Ashkenazi: no \par Mastery risk assessment: n/a\par BRCA testing: BRCA1 positive\par Bra size: \par \par Last mammogram: 2/27/2020                Location:  [facility]\par Report reviewed.                                 Images reviewed on PACS\par Results: BIRADS 4\par heterogeneously dense breasts. no evidence of malignancy.\par \par Last ultrasound: 8/11/2020                    Location: \par Report reviewed.                                 Images reviewed on PACS\par Results: BIRADS 6\par \par \par Last MRI: 11/3/2020                                 Location: [facility]\par Report reviewed.\par Results:\par central outer right breast enhancement 3.7cmx1.9cmx2.5cm (was 4.6x3.0x4.8cm) spiculation/NME extends to nipple and posterior to the capsule and pectoralis. \par Left: susceptibility artifact from a biopsy marker in LOQ previously seen enhancing mass no longer visualized. BIRADS 6\par \par

## 2020-11-30 NOTE — ASSESSMENT
[FreeTextEntry1] : doing well\par path reviewed copy will be mailed to her\par rec f/u with Dr. Anderson as scheduled\par f/u 2-3 months\par She knows to call or return sooner should any concerns or questions arise.\par

## 2020-12-02 ENCOUNTER — APPOINTMENT (OUTPATIENT)
Dept: PLASTIC SURGERY | Facility: CLINIC | Age: 55
End: 2020-12-02
Payer: MEDICAID

## 2020-12-02 VITALS
DIASTOLIC BLOOD PRESSURE: 76 MMHG | SYSTOLIC BLOOD PRESSURE: 109 MMHG | TEMPERATURE: 98.8 F | OXYGEN SATURATION: 99 % | HEART RATE: 86 BPM | RESPIRATION RATE: 20 BRPM

## 2020-12-02 PROCEDURE — 99024 POSTOP FOLLOW-UP VISIT: CPT

## 2020-12-02 NOTE — REASON FOR VISIT
[Post Op: _________] : a [unfilled] post op visit [FreeTextEntry1] : Ms. DENISE FERRARO returns for a follow up visit, generally doing well with no complaints and no fevers or chills at home. left drain less than 20 cc per 24 hours, right still greater than 30 cc

## 2020-12-02 NOTE — ASSESSMENT
[FreeTextEntry1] : A:\par Doing well post operative\par P:\par Left drain removed, right left in place\par continue antibiotic\par Following sterile prep, using the magnet for guidance, 50 cc sterile injectable saline placed in each expander for new total of 300/500 on each side

## 2020-12-02 NOTE — PHYSICAL EXAM
[de-identified] : Shape and contour are good\par incisions healing well\par flaps viable no collection

## 2020-12-09 ENCOUNTER — APPOINTMENT (OUTPATIENT)
Dept: PLASTIC SURGERY | Facility: CLINIC | Age: 55
End: 2020-12-09
Payer: MEDICAID

## 2020-12-09 VITALS
TEMPERATURE: 98.8 F | OXYGEN SATURATION: 98 % | HEART RATE: 68 BPM | DIASTOLIC BLOOD PRESSURE: 76 MMHG | RESPIRATION RATE: 16 BRPM | SYSTOLIC BLOOD PRESSURE: 99 MMHG

## 2020-12-09 PROCEDURE — 99024 POSTOP FOLLOW-UP VISIT: CPT

## 2020-12-09 NOTE — PHYSICAL EXAM
[NI] : Normal [de-identified] : Expanders in place \par no erythema or purulence\par flaps viable no collection

## 2020-12-09 NOTE — ASSESSMENT
[FreeTextEntry1] : A:\par Doing well post operative\par P:\par Drain removed \par start cyclobenzaprine for back spasm

## 2020-12-09 NOTE — REASON FOR VISIT
[Post Op: _________] : a [unfilled] post op visit [FreeTextEntry1] : Ms. DENISE FERRARO returns for a follow up visit, generally doing well with no complaints and no fevers or chills at home.  drain on right side decreased

## 2020-12-16 ENCOUNTER — APPOINTMENT (OUTPATIENT)
Dept: HEMATOLOGY ONCOLOGY | Facility: CLINIC | Age: 55
End: 2020-12-16
Payer: MEDICAID

## 2020-12-16 ENCOUNTER — APPOINTMENT (OUTPATIENT)
Dept: PLASTIC SURGERY | Facility: CLINIC | Age: 55
End: 2020-12-16
Payer: MEDICAID

## 2020-12-16 VITALS
DIASTOLIC BLOOD PRESSURE: 60 MMHG | RESPIRATION RATE: 15 BRPM | WEIGHT: 145 LBS | HEIGHT: 66 IN | OXYGEN SATURATION: 99 % | SYSTOLIC BLOOD PRESSURE: 100 MMHG | BODY MASS INDEX: 23.3 KG/M2 | HEART RATE: 94 BPM

## 2020-12-16 VITALS
OXYGEN SATURATION: 100 % | HEART RATE: 88 BPM | TEMPERATURE: 98.3 F | RESPIRATION RATE: 20 BRPM | DIASTOLIC BLOOD PRESSURE: 72 MMHG | SYSTOLIC BLOOD PRESSURE: 104 MMHG

## 2020-12-16 PROCEDURE — 99024 POSTOP FOLLOW-UP VISIT: CPT

## 2020-12-16 PROCEDURE — 99215 OFFICE O/P EST HI 40 MIN: CPT

## 2020-12-16 PROCEDURE — 99072 ADDL SUPL MATRL&STAF TM PHE: CPT

## 2020-12-16 NOTE — PHYSICAL EXAM
[Restricted in physically strenuous activity but ambulatory and able to carry out work of a light or sedentary nature] : Status 1- Restricted in physically strenuous activity but ambulatory and able to carry out work of a light or sedentary nature, e.g., light house work, office work [Normal] : affect appropriate [de-identified] : bilateral mastecomty with expanders in place , no drainage or erythema  [de-identified] : dystrophic naisl  [de-identified] : neuropathy

## 2020-12-16 NOTE — HISTORY OF PRESENT ILLNESS
[T: ___] : T[unfilled] [N: ___] : N[unfilled] [de-identified] : This is a 56 y/o woman who is otherwise in excellent health, first noticed a "lump" in her breast back in November.  The mass has waxed and waned in size since then. \par The mass has become more painful,but no skin changes or nipple dc. Her last mammo was about 2 years ago. \par Mammogram done in feb 2020 showed a 2.6 cm mass.  Her diagnosis and biopsy was delayed due to covid. Biopsy done  may 5th showed a high grade triple negative ki 67 was 90 %. 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 , (only received one dose) now on taxol weekly alone  [de-identified] : surgery nov 19. 2020 -  complete pCR , no residual cancer \par on expander fills weekly  will do may or june 2021 , tolerating well no fevers or drainage \par seeing plastics today \par ultrasound ovaries ok \par will plan to do hysterotomy after complete breast surgery ( after  breast surgery ) \par \par \par

## 2020-12-16 NOTE — ASSESSMENT
[FreeTextEntry1] : A:\par Doing well following expander reconstruction bilateral breasts\par P:\par Following a sterile prep, using the magnet for guidance, 50 cc sterile saline injected in each expander for new total of 350/500 cc\par Patient tolerated well\par Instructions reviewed \par

## 2020-12-16 NOTE — PHYSICAL EXAM
[Restricted in physically strenuous activity but ambulatory and able to carry out work of a light or sedentary nature] : Status 1- Restricted in physically strenuous activity but ambulatory and able to carry out work of a light or sedentary nature, e.g., light house work, office work [Normal] : affect appropriate [NI] : Normal [de-identified] : Shape and contour are good\par Flaps viable no collection \par

## 2020-12-16 NOTE — REASON FOR VISIT
[Follow-Up Visit] : a follow-up [Post Op: _________] : a [unfilled] post op visit [FreeTextEntry1] : Ms. DENISE FERRARO returns for a follow up visit, generally doing well with no complaints and no fevers or chills at home.  Currently doing well with 300/500 cc in place

## 2020-12-16 NOTE — ASSESSMENT
[FreeTextEntry1] : This is a 54 y/o woman with  new diagnosis of triple negative , high grade, breast cancer at least T2Nx. \par \par 1) breast cancer \par -s/p 4 cycles of AC neoadjuvant chemo  now s/p carbo taxol  one cycle , completed 12 doses of taxol alone oct 2020 \par -now s/p surgery 11/2020 with complete response , no residual cancer \par -dw patient at length , there is no clear benefit for an additional therapy in this case , if there was residual cancer could apply CREATE-X trial and recommend xeloda but given pCR no role for xeloda \par Explained there was a study out of [place] looking at metronomic xeloda for a year afer ADJUVANT chemo for triplenegative breast cancer but the role would be unclear in a patient with pCR after NEOadjuvant therapy \par - there also does not appear to be any role for radiation given the tumor was initailly T2 and node negative and now patient has had pCR  and mastecomty \par -will initiate close follow up , explained that risk fo recurrence is highest in few 2-5 years after  surgery \par -plan for markers cmp cbc today and every 3 months \par no role for imaging unless labs abnormal or symptoms \par \par \par 2) cardiomyopathy \par echo is normal \par \par 3) back pain \par petct neg \par \par 4) vit d def \par cont vit d 2000 units a day \par \par 5) vit b12 def \par cont  b12\par \par 6) nausea \par improved \par cont antiemetics prn \par \par 7) constipation \par improved \par \par 8) mouth sores \par resolved  \par \par 9) neuropathy \par likely due to chemo \par imrpoved now \par start gabapentin for neuropathy at bedtime \par \par 10) anemia \par imrpoving \par repeat now \par \par 11) BRCA  positive \par s/p TV ultrasound , repeat in 6 months \par follow ca 125 and ca 19-9 \par plan for oophorectomy after breast expander implant exchange \par colonoscopy every 5 years \par \par 11) insomnia \par Ativan prn \par start gabepntin prn \par \par \par dw patient and son at length \par follow up in  6 weeks

## 2020-12-16 NOTE — REASON FOR VISIT
[Follow-Up Visit] : a follow-up [FreeTextEntry2] : here for follow up after surgery ,for breast cancer

## 2020-12-23 LAB
CANCER AG15-3 SERPL-ACNC: 20.2 U/ML
CEA SERPL-MCNC: 2.8 NG/ML

## 2020-12-30 ENCOUNTER — APPOINTMENT (OUTPATIENT)
Dept: PLASTIC SURGERY | Facility: CLINIC | Age: 55
End: 2020-12-30
Payer: MEDICAID

## 2020-12-30 VITALS
DIASTOLIC BLOOD PRESSURE: 73 MMHG | OXYGEN SATURATION: 96 % | TEMPERATURE: 98.7 F | HEART RATE: 104 BPM | RESPIRATION RATE: 20 BRPM | SYSTOLIC BLOOD PRESSURE: 103 MMHG

## 2020-12-30 PROCEDURE — 99024 POSTOP FOLLOW-UP VISIT: CPT

## 2020-12-30 NOTE — ASSESSMENT
[FreeTextEntry1] : A:\par Doing well following bilateral expander reconstruction\par P:\par Following a sterile prep, and using the magnet for guidance, 50 cc sterile saline injected in each side, for new total of 400/500 cc\par Patient tolerated well\par Likes this size for reconstruction.  Reviewed the material risks,  benefits,  and alternatives with Ms. DENISE FERRARO  , including no surgery, and she  understands and wishes to proceed.\par

## 2020-12-30 NOTE — REASON FOR VISIT
[Post Op: _________] : a [unfilled] post op visit [FreeTextEntry1] : Ms. DENISE FERRARO returns for a follow up visit, generally doing well with no complaints and no fevers or chills at home.  currently 350/500 in place and presents for expansion

## 2020-12-30 NOTE — PHYSICAL EXAM
[NI] : Normal [de-identified] : SHape and contour are good\par flaps viable\par incisions clean and intact

## 2021-02-03 ENCOUNTER — APPOINTMENT (OUTPATIENT)
Dept: BREAST CENTER | Facility: CLINIC | Age: 56
End: 2021-02-03
Payer: MEDICAID

## 2021-02-03 ENCOUNTER — APPOINTMENT (OUTPATIENT)
Dept: PLASTIC SURGERY | Facility: CLINIC | Age: 56
End: 2021-02-03
Payer: MEDICAID

## 2021-02-03 ENCOUNTER — APPOINTMENT (OUTPATIENT)
Dept: HEMATOLOGY ONCOLOGY | Facility: CLINIC | Age: 56
End: 2021-02-03
Payer: MEDICAID

## 2021-02-03 VITALS
WEIGHT: 155 LBS | TEMPERATURE: 99 F | SYSTOLIC BLOOD PRESSURE: 117 MMHG | RESPIRATION RATE: 18 BRPM | BODY MASS INDEX: 25.02 KG/M2 | HEART RATE: 72 BPM | OXYGEN SATURATION: 99 % | DIASTOLIC BLOOD PRESSURE: 74 MMHG

## 2021-02-03 VITALS
OXYGEN SATURATION: 98 % | RESPIRATION RATE: 18 BRPM | TEMPERATURE: 98.5 F | HEART RATE: 72 BPM | DIASTOLIC BLOOD PRESSURE: 75 MMHG | SYSTOLIC BLOOD PRESSURE: 120 MMHG

## 2021-02-03 PROCEDURE — 99214 OFFICE O/P EST MOD 30 MIN: CPT

## 2021-02-03 PROCEDURE — 99024 POSTOP FOLLOW-UP VISIT: CPT

## 2021-02-03 PROCEDURE — 99072 ADDL SUPL MATRL&STAF TM PHE: CPT

## 2021-02-03 PROCEDURE — 93702 BIS XTRACELL FLUID ANALYSIS: CPT

## 2021-02-03 NOTE — ASSESSMENT
[FreeTextEntry1] : 57 yo female with TNBC bilaterally\par doing well\par derm referral \par LDEX in 3 months\par

## 2021-02-03 NOTE — PHYSICAL EXAM
[Symmetrical] : symmetrical [de-identified] : s/p SSMx and TE bilaterally, no masses, no collections

## 2021-02-03 NOTE — HISTORY OF PRESENT ILLNESS
[FreeTextEntry1] : This is a 55 year old female referred by Dr. Shen for a newly diagnosed right breast cancer. She states she felt a lump since November 2019. It was painful with pressure.\par Her last imaging was 2 years ago in [place] and she doesn't remember where.She started garlic, oregano supplements, essro, vitamin e and super primose supplements since her diagnosis. She has bilateral retropectoral silicone implants which were placed in [place] 2011. \par \par On February 27, 2020 she presented for imaging for the right breast mass. In the right breast 9:00 a 2.6 cm macro lobulated hypoechoic nodule was seen which correlated with the palpable lump. Cystocele the left breast. On May 5, 2020 she presented for right breast core biopsy and right breast 9:00 6 cm (twirl clip) from the nipple revealed a infiltrating duct carcinoma grade 2 (2nd op path at [facility] felt it was grade 3) with necrosis and papillary features, estrogen 0%-negative, progesterone 0%-negative, HER-2-negative by FISH and IHC, Ki-67 90%. \par \par She is s/p right axillary USGBx 6/1/2020 showed reactive lymph node and left breast 4N6 USGbx showed IDC, grade 9/9, TNBC Ki67 90%.  She completed chemo (ddACT) carboplatin was discontinued due to neuropathy 10/23/2020. She is s/p bilateral SSM and SLNE 11/19/2020 and no residual cancer; right 3 lymph nodes removed and left 2 all negative.\par \par She does NOT perform SBE. \par She has not noticed a change in her breast or a breast lump on the left. She feels a right breast mass as above since november.\par She has not noticed a change in her nipple or nipple area.\par She has not noticed a change in the skin of the breast.\par She is not experiencing nipple discharge.\par She is experiencing right breast pain since her bx.\par She has not noticed a lump or lymph node under the armpit. \par \par BREAST CANCER RISK FACTORS\par Menarche: 17\par Date of LMP: 2013\par Menopause: post age 48\par Grav: 4     Para: 4\par Age at first live birth: 21\par Nursed: yes\par Hysterectomy: no\par Oophorectomy: no\par OCP: yes in the past \par HRT: no\par Last pap/pelvic exam: 2019 WNL \par Related family history: none\par Ashkenazi: no \par Mastery risk assessment: n/a\par BRCA testing: BRCA1 positive\par Bra size: \par \par Last mammogram: 2/27/2020                Location:  [facility]\par Report reviewed.                                 Images reviewed on PACS\par Results: BIRADS 4\par heterogeneously dense breasts. no evidence of malignancy.\par \par Last ultrasound: 8/11/2020                    Location: \par Report reviewed.                                 Images reviewed on PACS\par Results: BIRADS 6\par \par \par Last MRI: 11/3/2020                                 Location: [facility]\par Report reviewed.\par Results:\par central outer right breast enhancement 3.7cmx1.9cmx2.5cm (was 4.6x3.0x4.8cm) spiculation/NME extends to nipple and posterior to the capsule and pectoralis. \par Left: susceptibility artifact from a biopsy marker in LOQ previously seen enhancing mass no longer visualized. BIRADS 6\par \par

## 2021-02-03 NOTE — ASSESSMENT
[FreeTextEntry1] : A:\par Doing well following mastectomy and expander reconstruction\par P:\par Following a sterile prep, using the magnet for guidance, 50 cc sterile saline injected in each side, for new total 450/500 on each side.\par Patient tolerated well

## 2021-02-03 NOTE — ASSESSMENT
[FreeTextEntry1] : This is a 56 y/o woman with  new diagnosis of triple negative , high grade, breast cancer at least T2Nx. \par \par 1) breast cancer \par -s/p 4 cycles of AC neoadjuvant chemo  now s/p carbo taxol  one cycle , completed 12 doses of taxol alone oct 2020 \par -Status post complete pathological response, 11/20/20 \par -No clear role for additional treatment such as Xeloda, there is some data out of [place] for metronomic Xeloda after standard systemic adjuvant therapy however this has not been studied in the patient population with a complete pathological response.\par -Patient does not meet criteria for the create X trial as she has had a complete pathological response\par -So here the role is unclear\par -Repeat markers\par -Continue close observation\par -Follow-up with breast surgery and plastics\par \par \par 2) cardiomyopathy \par echo is normal \par \par 3) back pain \par petct neg \par \par 4) vit d def \par cont vit d 2000 units a day , check level \par needs dexa at some point \par \par 5) vit b12 def \par cont  b12, check level \par \par 6) BRCA positive \par Status post bilateral mastectomy\par Needs to have complete hysterectomy, however patient is delaying until her breast surgery is finalized\par Continue to monitor transvaginal ultrasounds in the meantime as well as Ca1 25\par We will continue to monitor CA 19–9\par We will also make sure that she has colonoscopy every 5 years and encouraged her to see a dermatologist\par \par 7) constipation \par improved , cont bowel regimen \par \par \par 8 ) neuropathy \par likely due to chemo \par Continues to improve\par \par 9) anemia \par improving \par repeat cbc today \par \par 11) insomnia \par Ativan prn \par \par Follow-up in 3 months, encouraged to call with any new signs or symptoms sooner.

## 2021-02-03 NOTE — HISTORY OF PRESENT ILLNESS
[de-identified] : This is a 56 y/o woman who is otherwise in excellent health, first noticed a "lump" in her breast back in November.  The mass has waxed and waned in size since then. \par The mass has become more painful,but no skin changes or nipple dc. Her last mammo was about 2 years ago. \par Mammogram done in feb 2020 showed a 2.6 cm mass.  Her diagnosis and biopsy was delayed due to covid. Biopsy done  may 5th showed a high grade triple negative ki 67 was 90 %. 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 , (only received one dose) now on taxol weekly alone \par \par MRI reviewed shows iresolution on the left and on the right there is improvement in mass but persistant enhancement to pectoralis and extednign to nipple \par \par Bilateral mastectomy with implant reconstruction scheduled for 11/19/2020 with Rhoda Cottrell and Dinorah\par \par ultrasound ovaries ok \par will plan to do hysterotomy after complete breast surgery  [de-identified] : Feeling overall very well, no new complaints\par neuropathy is better \par energy better \par hair growing well \par eating better \par next surgery in april 9, 2021, will be having  expander removal and implant placement\par

## 2021-02-03 NOTE — REASON FOR VISIT
[Follow-Up: _____] : a [unfilled] follow-up visit [FreeTextEntry1] : Patient returns for expansion of bilateral breast expanders.  Currently doing well, 400/500 in place

## 2021-02-03 NOTE — PHYSICAL EXAM
[de-identified] : Bilateral mastectomy with expanders in place, wound healing well no drainage no erythema, no masses or nodules palpable no axillary adenopathy [de-identified] : neuropathy

## 2021-02-03 NOTE — REASON FOR VISIT
[FreeTextEntry2] : Triple negative breast cancer status post bilateral mastectomy here for routine follow-up

## 2021-02-07 LAB
CANCER AG15-3 SERPL-ACNC: 16.8 U/ML
CEA SERPL-MCNC: 3 NG/ML

## 2021-03-10 ENCOUNTER — APPOINTMENT (OUTPATIENT)
Dept: PLASTIC SURGERY | Facility: CLINIC | Age: 56
End: 2021-03-10
Payer: MEDICAID

## 2021-03-10 VITALS
DIASTOLIC BLOOD PRESSURE: 74 MMHG | SYSTOLIC BLOOD PRESSURE: 103 MMHG | TEMPERATURE: 98.6 F | HEART RATE: 83 BPM | RESPIRATION RATE: 18 BRPM | OXYGEN SATURATION: 99 %

## 2021-03-10 PROCEDURE — 99072 ADDL SUPL MATRL&STAF TM PHE: CPT

## 2021-03-10 PROCEDURE — 99212 OFFICE O/P EST SF 10 MIN: CPT

## 2021-03-10 NOTE — REASON FOR VISIT
[Follow-Up: _____] : a [unfilled] follow-up visit [FreeTextEntry1] : Ms. DENISE FERRARO returns for a follow up visit, generally doing well with no complaints and no fevers or chills at home.  SHe has 450 cc expanders in place

## 2021-04-09 ENCOUNTER — APPOINTMENT (OUTPATIENT)
Dept: THORACIC SURGERY | Facility: HOSPITAL | Age: 56
End: 2021-04-09

## 2021-04-09 ENCOUNTER — APPOINTMENT (OUTPATIENT)
Dept: PLASTIC SURGERY | Facility: HOSPITAL | Age: 56
End: 2021-04-09
Payer: MEDICAID

## 2021-04-09 PROCEDURE — 11970 RPLCMT TISS XPNDR PERM IMPLT: CPT | Mod: 50

## 2021-04-16 ENCOUNTER — APPOINTMENT (OUTPATIENT)
Dept: PLASTIC SURGERY | Facility: CLINIC | Age: 56
End: 2021-04-16
Payer: MEDICAID

## 2021-04-16 VITALS
OXYGEN SATURATION: 97 % | SYSTOLIC BLOOD PRESSURE: 109 MMHG | TEMPERATURE: 98.8 F | RESPIRATION RATE: 18 BRPM | DIASTOLIC BLOOD PRESSURE: 75 MMHG | HEART RATE: 68 BPM

## 2021-04-16 PROCEDURE — 99024 POSTOP FOLLOW-UP VISIT: CPT

## 2021-04-16 NOTE — REASON FOR VISIT
[Post Op: _________] : a [unfilled] post op visit [FreeTextEntry1] : Ms. DENISE FERRARO returns for a follow up visit, generally doing well with no complaints and no fevers or chills at home.  Generally pleased with the results of her surgery

## 2021-04-28 ENCOUNTER — APPOINTMENT (OUTPATIENT)
Dept: PLASTIC SURGERY | Facility: CLINIC | Age: 56
End: 2021-04-28
Payer: MEDICAID

## 2021-04-28 VITALS
TEMPERATURE: 98.2 F | SYSTOLIC BLOOD PRESSURE: 111 MMHG | RESPIRATION RATE: 18 BRPM | DIASTOLIC BLOOD PRESSURE: 78 MMHG | HEART RATE: 83 BPM | OXYGEN SATURATION: 99 %

## 2021-04-28 PROCEDURE — 99024 POSTOP FOLLOW-UP VISIT: CPT

## 2021-04-28 NOTE — REASON FOR VISIT
[Post Op: _________] : a [unfilled] post op visit [FreeTextEntry1] : Ms. DENISE FERRARO returns for a follow up visit, generally doing well with no complaints and no fevers or chills at home.  interested in nipple areolar reconstruction

## 2021-04-28 NOTE — ASSESSMENT
[FreeTextEntry1] : A:\par Doing well post operative\par P:\par Prineo removed\par schedule nipple areolar reconstruction\par Reviewed the material risks,  benefits,  and alternatives with Ms. DENISE FERRARO  , including no surgery, and she  understands and wishes to proceed.\par

## 2021-04-28 NOTE — PHYSICAL EXAM
[NI] : Normal [de-identified] : SHape and contour are good\par incisions healing well\par flaps viable no collection

## 2021-05-05 ENCOUNTER — NON-APPOINTMENT (OUTPATIENT)
Age: 56
End: 2021-05-05

## 2021-05-05 ENCOUNTER — APPOINTMENT (OUTPATIENT)
Dept: BREAST CENTER | Facility: CLINIC | Age: 56
End: 2021-05-05
Payer: MEDICAID

## 2021-05-05 ENCOUNTER — APPOINTMENT (OUTPATIENT)
Dept: HEMATOLOGY ONCOLOGY | Facility: CLINIC | Age: 56
End: 2021-05-05
Payer: MEDICAID

## 2021-05-05 VITALS
TEMPERATURE: 98.2 F | BODY MASS INDEX: 26.03 KG/M2 | RESPIRATION RATE: 18 BRPM | HEART RATE: 71 BPM | HEIGHT: 66 IN | WEIGHT: 162 LBS | SYSTOLIC BLOOD PRESSURE: 122 MMHG | DIASTOLIC BLOOD PRESSURE: 82 MMHG | OXYGEN SATURATION: 98 %

## 2021-05-05 VITALS
DIASTOLIC BLOOD PRESSURE: 79 MMHG | SYSTOLIC BLOOD PRESSURE: 112 MMHG | HEIGHT: 66 IN | WEIGHT: 159 LBS | BODY MASS INDEX: 25.55 KG/M2 | HEART RATE: 80 BPM

## 2021-05-05 PROCEDURE — 99215 OFFICE O/P EST HI 40 MIN: CPT | Mod: 25

## 2021-05-05 PROCEDURE — 93702 BIS XTRACELL FLUID ANALYSIS: CPT

## 2021-05-05 PROCEDURE — 99072 ADDL SUPL MATRL&STAF TM PHE: CPT

## 2021-05-05 PROCEDURE — 99214 OFFICE O/P EST MOD 30 MIN: CPT

## 2021-05-05 NOTE — HISTORY OF PRESENT ILLNESS
[FreeTextEntry1] : This is a 55 year old female referred by Dr. Shen for a newly diagnosed right breast cancer. She states she felt a lump since November 2019. It was painful with pressure.\par Her last imaging was 2 years ago in [place] and she doesn't remember where.She started garlic, oregano supplements, essro, vitamin e and super primose supplements since her diagnosis. She has bilateral retropectoral silicone implants which were placed in [facility] [place] 2011. \par \par On February 27, 2020 she presented for imaging for the right breast mass. In the right breast 9:00 a 2.6 cm macro lobulated hypoechoic nodule was seen which correlated with the palpable lump. Cystocele the left breast. On May 5, 2020 she presented for right breast core biopsy and right breast 9:00 6 cm (twirl clip) from the nipple revealed a infiltrating duct carcinoma grade 2 (2nd op path at [facility] felt it was grade 3) with necrosis and papillary features, estrogen 0%-negative, progesterone 0%-negative, HER-2-negative by FISH and IHC, Ki-67 90%. \par \par She is s/p right axillary USGBx 6/1/2020 showed reactive lymph node and left breast 4N6 USGbx showed IDC, grade 9/9, TNBC Ki67 90%.  She completed chemo (ddACT) carboplatin was discontinued due to neuropathy 10/23/2020. She is s/p bilateral SSM and SLNE 11/19/2020 and no residual cancer; right 3 lymph nodes removed and left 2 all negative. Her mediport was removed 4/9/2021 and implants were placed.\par \par She does NOT perform SBE. \par She has not noticed a change in her breast or a breast lump on the left. She feels a right breast mass as above since november.\par She has not noticed a change in her nipple or nipple area.\par She has not noticed a change in the skin of the breast.\par She is not experiencing nipple discharge.\par She is experiencing right breast pain since her bx.\par She has not noticed a lump or lymph node under the armpit. \par \par BREAST CANCER RISK FACTORS\par Menarche: 17\par Date of LMP: 2013\par Menopause: post age 48\par Grav: 4     Para: 4\par Age at first live birth: 21\par Nursed: yes\par Hysterectomy: no\par Oophorectomy: no\par OCP: yes in the past \par HRT: no\par Last pap/pelvic exam: 2019 WNL \par Related family history: none\par Ashkenazi: no \par Mastery risk assessment: n/a\par BRCA testing: BRCA1 positive\par Bra size: \par \par Last mammogram: 2/27/2020                Location:  [facility]\par Report reviewed.                                 Images reviewed on PACS\par Results: BIRADS 4\par heterogeneously dense breasts. no evidence of malignancy.\par \par Last ultrasound: 8/11/2020                    Location: [facility]\par Report reviewed.                                 Images reviewed on PACS\par Results: BIRADS 6\par \par \par Last MRI: 11/3/2020                                 Location: [facility]\par Report reviewed.\par Results:\par central outer right breast enhancement 3.7cmx1.9cmx2.5cm (was 4.6x3.0x4.8cm) spiculation/NME extends to nipple and posterior to the capsule and pectoralis. \par Left: susceptibility artifact from a biopsy marker in LOQ previously seen enhancing mass no longer visualized. BIRADS 6\par \par

## 2021-05-05 NOTE — HISTORY OF PRESENT ILLNESS
[T: ___] : T[unfilled] [N: ___] : N[unfilled] [de-identified] : This is a 54 y/o woman who is otherwise in excellent health, first noticed a "lump" in her breast back in November.  The mass has waxed and waned in size since then. \par The mass has become more painful,but no skin changes or nipple dc. Her last mammo was about 2 years ago. \par Mammogram done in feb 2020 showed a 2.6 cm mass.  Her diagnosis and biopsy was delayed due to covid. Biopsy done  may 5th showed a high grade triple negative ki 67 was 90 %. 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 , (only received one dose) now on taxol weekly alone \par \par MRI reviewed shows iresolution on the left and on the right there is improvement in mass but persistant enhancement to pectoralis and extednign to nipple \par \par Bilateral mastectomy with implant reconstruction scheduled for 11/19/2020 with Rhoda Cottrell and Dinorah\par \par ultrasound ovaries ok \par will plan to do hysterotomy after complete breast surgery  [de-identified] : \par went april 2021  for surgery expander exhange, going for nipple reconstruction , june 25 2021 \par hair growing well \par port removed \par feeling well \par not sleeping at all , gets up at 5 am \par hasn’t started working out yet \par using medical marijuana to help sleeping \par expires medical marijuana \par no new complaints otherwise - no pain no sob

## 2021-05-05 NOTE — PHYSICAL EXAM
[Restricted in physically strenuous activity but ambulatory and able to carry out work of a light or sedentary nature] : Status 1- Restricted in physically strenuous activity but ambulatory and able to carry out work of a light or sedentary nature, e.g., light house work, office work [Normal] : affect appropriate [de-identified] : Bilateral mastectomy with implants in place, no masses or nodules palpable no axillary adenopathy [de-identified] : neuropathy

## 2021-05-05 NOTE — PHYSICAL EXAM
[Symmetrical] : symmetrical [Normocephalic] : normocephalic [Atraumatic] : atraumatic [Supple] : supple [No Supraclavicular Adenopathy] : no supraclavicular adenopathy [Examined in the supine and seated position] : examined in the supine and seated position [No dominant masses] : no dominant masses in right breast  [No dominant masses] : no dominant masses left breast [No Axillary Lymphadenopathy] : no left axillary lymphadenopathy [No Edema] : no edema [No Rashes] : no rashes [No Ulceration] : no ulceration [de-identified] : s/p SSMx and implant bilaterally, no masses, no collections

## 2021-05-05 NOTE — ASSESSMENT
[FreeTextEntry1] : This is a 56 y/o woman with  new diagnosis of triple negative , high grade, breast cancer at least T2Nx. \par \par 1) breast cancer \par -s/p 4 cycles of AC neoadjuvant chemo , s/p carbo taxol x1 then completed 12 doses of taxol alone oct 2020 \par -Status post complete pathological response, 11/20/20 \par -No clear role for additional treatment such as Xeloda, there is some data out of [place] for metronomic Xeloda after standard systemic adjuvant therapy however this has not been studied in the patient population with a complete pathological response.\par -Patient does not meet criteria for the create X trial as she has had a complete pathological response\par -markers negative repeat now \par -Continue close observation\par -advise wieght loss and exercise \par \par 2) cardiomyopathy \par echo is normal \par \par 3) back pain \par petct neg \par \par 4) vit d def \par cont vit d 2000 units a day , check level \par needs dexa at some point \par \par 5) vit b12 def \par cont  b12, check level \par \par 6) BRCA positive \par Status post bilateral mastectomy\par will be seeing dr crawford for fall for hysterectomy \par repeat ca 125 and ca 19-9 \par check TV ultrasond now \par We will also make sure that she has colonoscopy every 5 years and encouraged her to see a dermatologist\par \par 7) constipation \par improved , cont bowel regimen \par \par \par 8 ) neuropathy \par likely due to chemo \par Continues to improve\par \par 9) anemia \par improving \par repeat cbc today \par \par 11) insomnia \par Ativan prn , medical cannibis prn \par \par dw patient and son at length \par \par Follow-up in 3 months, encouraged to call with any new signs or symptoms sooner.

## 2021-05-05 NOTE — ASSESSMENT
[FreeTextEntry1] : 55 yo female with TNBC bilaterally\par rec f/u Dr. Damon for BSO\par derm referral/pcp\par LDEX in 3 months\par We reviewed risk reduction strategies including maintaining a BMI <25, limiting red meat intake and alcoholic beverages to 3 per week and exercise (150 min/ week low intensity or 75 min/week high intensity). And maintaining a normal vitamin D level.\par \par She knows to call or return sooner should any concerns or questions arise.\par \par

## 2021-05-05 NOTE — REASON FOR VISIT
[Follow-Up Visit] : a follow-up [FreeTextEntry2] : Triple negative breast cancer status post bilateral mastectomy here for routine follow-up

## 2021-05-13 ENCOUNTER — NON-APPOINTMENT (OUTPATIENT)
Age: 56
End: 2021-05-13

## 2021-05-16 LAB
CANCER AG15-3 SERPL-ACNC: 17.7 U/ML
CEA SERPL-MCNC: 2.5 NG/ML
FOLATE SERPL-MCNC: 15.4 NG/ML
VIT B12 SERPL-MCNC: 357 PG/ML

## 2021-05-18 ENCOUNTER — NON-APPOINTMENT (OUTPATIENT)
Age: 56
End: 2021-05-18

## 2021-06-25 ENCOUNTER — APPOINTMENT (OUTPATIENT)
Dept: PLASTIC SURGERY | Facility: HOSPITAL | Age: 56
End: 2021-06-25
Payer: MEDICAID

## 2021-06-25 PROCEDURE — 19350 NIPPLE/AREOLA RECONSTRUCTION: CPT | Mod: 50,78

## 2021-06-25 PROCEDURE — 19380 REVJ RECONSTRUCTED BREAST: CPT | Mod: 50,59,78

## 2021-06-30 ENCOUNTER — APPOINTMENT (OUTPATIENT)
Dept: PLASTIC SURGERY | Facility: CLINIC | Age: 56
End: 2021-06-30
Payer: MEDICAID

## 2021-06-30 VITALS
HEART RATE: 100 BPM | OXYGEN SATURATION: 98 % | DIASTOLIC BLOOD PRESSURE: 80 MMHG | RESPIRATION RATE: 18 BRPM | TEMPERATURE: 98.7 F | SYSTOLIC BLOOD PRESSURE: 106 MMHG

## 2021-06-30 PROCEDURE — 99024 POSTOP FOLLOW-UP VISIT: CPT

## 2021-06-30 NOTE — ASSESSMENT
[FreeTextEntry1] : A:\par Doing well post operative\par P:\par Instructions reviewed\par suture removal next week

## 2021-06-30 NOTE — REASON FOR VISIT
[Post Op: _________] : a [unfilled] post op visit [FreeTextEntry1] : Ms. DENISE FERRARO returns for a follow up visit, generally doing well with no complaints and no fevers or chills at home.  Generally feels well and pleased with appearance

## 2021-07-07 ENCOUNTER — APPOINTMENT (OUTPATIENT)
Dept: PLASTIC SURGERY | Facility: CLINIC | Age: 56
End: 2021-07-07
Payer: MEDICAID

## 2021-07-07 VITALS
RESPIRATION RATE: 20 BRPM | DIASTOLIC BLOOD PRESSURE: 75 MMHG | OXYGEN SATURATION: 100 % | TEMPERATURE: 97.8 F | SYSTOLIC BLOOD PRESSURE: 113 MMHG | HEART RATE: 88 BPM

## 2021-07-07 PROCEDURE — 99024 POSTOP FOLLOW-UP VISIT: CPT

## 2021-07-07 NOTE — REASON FOR VISIT
[Post Op: _________] : a [unfilled] post op visit [FreeTextEntry1] : Ms. DENISE FERRARO  returns for suture removal, generally doing well with no complaints and no fevers or chills at home.

## 2021-07-07 NOTE — ASSESSMENT
[FreeTextEntry1] : A;\par Doing well post reconstruction\par P:\par Sutures removed and care reviewed

## 2021-08-04 ENCOUNTER — APPOINTMENT (OUTPATIENT)
Dept: HEMATOLOGY ONCOLOGY | Facility: CLINIC | Age: 56
End: 2021-08-04
Payer: MEDICAID

## 2021-08-04 ENCOUNTER — APPOINTMENT (OUTPATIENT)
Dept: PLASTIC SURGERY | Facility: CLINIC | Age: 56
End: 2021-08-04
Payer: MEDICAID

## 2021-08-04 ENCOUNTER — APPOINTMENT (OUTPATIENT)
Dept: BREAST CENTER | Facility: CLINIC | Age: 56
End: 2021-08-04
Payer: MEDICAID

## 2021-08-04 VITALS
TEMPERATURE: 98 F | RESPIRATION RATE: 18 BRPM | WEIGHT: 163 LBS | BODY MASS INDEX: 26.2 KG/M2 | OXYGEN SATURATION: 100 % | HEIGHT: 66 IN | DIASTOLIC BLOOD PRESSURE: 75 MMHG | HEART RATE: 70 BPM | SYSTOLIC BLOOD PRESSURE: 131 MMHG

## 2021-08-04 VITALS
SYSTOLIC BLOOD PRESSURE: 119 MMHG | HEART RATE: 79 BPM | BODY MASS INDEX: 26.84 KG/M2 | DIASTOLIC BLOOD PRESSURE: 83 MMHG | WEIGHT: 167 LBS | HEIGHT: 66 IN

## 2021-08-04 VITALS
DIASTOLIC BLOOD PRESSURE: 83 MMHG | RESPIRATION RATE: 20 BRPM | TEMPERATURE: 98.6 F | OXYGEN SATURATION: 100 % | HEART RATE: 63 BPM | SYSTOLIC BLOOD PRESSURE: 115 MMHG

## 2021-08-04 DIAGNOSIS — D64.9 ANEMIA, UNSPECIFIED: ICD-10-CM

## 2021-08-04 DIAGNOSIS — R11.2 NAUSEA WITH VOMITING, UNSPECIFIED: ICD-10-CM

## 2021-08-04 DIAGNOSIS — K59.00 CONSTIPATION, UNSPECIFIED: ICD-10-CM

## 2021-08-04 PROCEDURE — 99214 OFFICE O/P EST MOD 30 MIN: CPT | Mod: 25

## 2021-08-04 PROCEDURE — 99024 POSTOP FOLLOW-UP VISIT: CPT

## 2021-08-04 PROCEDURE — 99214 OFFICE O/P EST MOD 30 MIN: CPT

## 2021-08-04 PROCEDURE — 93702 BIS XTRACELL FLUID ANALYSIS: CPT

## 2021-08-04 NOTE — PHYSICAL EXAM
[Normal] : affect appropriate [de-identified] : Bilateral mastectomy with implants in place, new nipples in place  no masses or nodules palpable no axillary adenopathy [de-identified] : neuropathy

## 2021-08-04 NOTE — PHYSICAL EXAM
[Normocephalic] : normocephalic [Atraumatic] : atraumatic [Supple] : supple [No Supraclavicular Adenopathy] : no supraclavicular adenopathy [Examined in the supine and seated position] : examined in the supine and seated position [Symmetrical] : symmetrical [No dominant masses] : no dominant masses in right breast  [No dominant masses] : no dominant masses left breast [No Axillary Lymphadenopathy] : no left axillary lymphadenopathy [No Edema] : no edema [No Rashes] : no rashes [No Ulceration] : no ulceration [de-identified] : s/p SSMx and implant bilaterally, no masses, no collections, reconstructed nipple [de-identified] : at site of patient's pain, IMF 6:00 there is keloid scar

## 2021-08-04 NOTE — REASON FOR VISIT
[Follow-Up Visit] : a follow-up [FreeTextEntry2] : Presents for follow-up of triple negative breast cancer

## 2021-08-04 NOTE — ASSESSMENT
[FreeTextEntry1] : 57 yo female with TNBC bilaterally\par rec f/u Dr. Damon for BSO\par derm referral/pcp\par LDEX in 3 months; today's LDEX completed WNL\par discussed MRI for surveillance\par \par We reviewed risk reduction strategies including maintaining a BMI <25, limiting red meat intake and alcoholic beverages to 3 per week and exercise (150 min/ week low intensity or 75 min/week high intensity). And maintaining a normal vitamin D level.\par f/u 3 months\par \par She knows to call or return sooner should any concerns or questions arise.\par \par

## 2021-08-04 NOTE — HISTORY OF PRESENT ILLNESS
[T: ___] : T[unfilled] [N: ___] : N[unfilled] [de-identified] : This is a 54 y/o woman who is otherwise in excellent health, first noticed a "lump" in her breast back in November.  The mass has waxed and waned in size since then. \par The mass has become more painful,but no skin changes or nipple dc. Her last mammo was about 2 years ago. \par Mammogram done in feb 2020 showed a 2.6 cm mass.  Her diagnosis and biopsy was delayed due to covid. Biopsy done  may 5th showed a high grade triple negative ki 67 was 90 %. 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 , (only received one dose) now on taxol weekly alone \par \par MRI reviewed shows iresolution on the left and on the right there is improvement in mass but persistant enhancement to pectoralis and extednign to nipple \par \par Bilateral mastectomy with implant reconstruction scheduled for 11/19/2020 with Rhoda Cottrell and Dinorah\par \par ultrasound ovaries ok \par will plan to do hysterotomy after complete breast surgery  [de-identified] : Feeling well no new compllaints \par june   had nipple reconstructions \par better sleeping, using gummies  ( medical cannibis)                                                                                                                                                                                                             s/p eval dr kumar

## 2021-08-04 NOTE — ASSESSMENT
[FreeTextEntry1] : This is a 54 y/o woman with  new diagnosis of triple negative , high grade, breast cancer at least T2Nx. \par \par 1) breast cancer \par -s/p 4 cycles of AC neoadjuvant chemo , s/p carbo taxol x1 then completed 12 doses of taxol alone oct 2020 \par -Status post complete pathological response, 11/20/20 \par -Patient does not meet criteria for the create X trial as she has had a complete pathological response\par -markers negative repeat now \par -Discussed the possibility of parp inhibitor Lynparza for 1 year based on the [employer] data recently published, discussed side effects including but not limited to anemia other cytopenias, fatigue, GI side effects, secondary leukemias.  The role of the Lynparza in the situation this far out from chemotherapy is unclear and especially considering that the patient had a complete pathological response.\par -We also discussed the recent approval of Keytruda in the adjuvant treatment of triple negative breast cancer.  Again the role in this case is unclear considering that the patient completed chemotherapy back in October and had surgery in November however I think it should be considered.  Discussed side effects including autoimmune effects on the thyroid, colon, liver and lung.\par -will think about these options and let me know \par \par 2) cardiomyopathy \par echo is normal \par \par 3) back pain \par petct neg \par \par 4) vit d def \par cont vit d 2000 units a day , check level \par needs dexa at some point \par \par 5) vit b12 def \par cont  b12, check level \par \par 6) BRCA positive \par Status post bilateral mastectomy\par will be seeing dr crawford for fall for hysterectomy \par repeat ca 125 and ca 19-9 today \par check TV ultrasond now \par We will also make sure that she has colonoscopy every 5 years , ecnoaurged her to do now \par encouraged her to see a dermatologist\par \par 7) constipation \par improved , cont bowel regimen \par \par \par 8 ) neuropathy \par likely due to chemo \par Continues to improve\par \par 9) anemia \par improved \par repeat cbc today \par \par 11) insomnia \par Ativan prn , medical cannibis prn \par \par dw patient and son at length \par \par Follow-up in 3 months

## 2021-08-04 NOTE — HISTORY OF PRESENT ILLNESS
[FreeTextEntry1] : This is a 56 year old female referred by Dr. Shen for a newly diagnosed right breast cancer. She states she felt a lump since November 2019. It was painful with pressure.\par Her last imaging was 2 years ago in [place] and she doesn't remember where.She started garlic, oregano supplements, essro, vitamin e and super primose supplements since her diagnosis. She has bilateral retropectoral silicone implants which were placed in [facility]. \par \par On February 27, 2020 she presented for imaging for the right breast mass. In the right breast 9:00 a 2.6 cm macro lobulated hypoechoic nodule was seen which correlated with the palpable lump. Cystocele the left breast. On May 5, 2020 she presented for right breast core biopsy and right breast 9:00 6 cm (twirl clip) from the nipple revealed a infiltrating duct carcinoma grade 2 (2nd op path at [facility] felt it was grade 3) with necrosis and papillary features, estrogen 0%-negative, progesterone 0%-negative, HER-2-negative by FISH and IHC, Ki-67 90%. \par \par She is s/p right axillary USGBx 6/1/2020 showed reactive lymph node and left breast 4N6 USGbx showed IDC, grade 9/9, TNBC Ki67 90%.  She completed chemo (ddACT) carboplatin was discontinued due to neuropathy 10/23/2020. She is s/p bilateral SSM and SLNE 11/19/2020 and no residual cancer; right 3 lymph nodes removed and left 2 all negative. Her mediport was removed 4/9/2021 and implants were placed.\par \par She does NOT perform SBE. \par She has not noticed a change in her reconstruction breast.\par She has not noticed a change in her nipple or nipple area.\par She has not noticed a change in the skin of the breast.\par She is not experiencing nipple discharge.\par She is experiencing left breast pain at the scar IMF.\par She has not noticed a lump or lymph node under the armpit. \par \par BREAST CANCER RISK FACTORS\par Menarche: 17\par Date of LMP: 2013\par Menopause: post age 48\par Grav: 4     Para: 4\par Age at first live birth: 21\par Nursed: yes\par Hysterectomy: no\par Oophorectomy: no\par OCP: yes in the past \par HRT: no\par Last pap/pelvic exam: 2019 WNL \par Related family history: none\par Ashkenazi: no \par Mastery risk assessment: n/a\par BRCA testing: BRCA1 positive\par Bra size: \par \par Last mammogram: 2/27/2020                Location:  [place]\par Report reviewed.                                 Images reviewed on PACS\par Results: BIRADS 4\par heterogeneously dense breasts. no evidence of malignancy.\par \par Last ultrasound: 8/11/2020                    Location: \par Report reviewed.                                 Images reviewed on PACS\par Results: BIRADS 6\par marker decrease in size in the known mass in the right 9:00 axis, 6cm from the nipple. Previously seen mass in the left 4:00 axis is not identified on the current study. \par \par Last MRI: 11/3/2020                                 Location: [facility]\par Report reviewed.\par Results:BIRADS 6\par central outer right breast enhancement 3.7cmx1.9cmx2.5cm (was 4.6x3.0x4.8cm) spiculation/NME extends to nipple and posterior to the capsule and pectoralis. \par Left: susceptibility artifact from a biopsy marker in LOQ previously seen enhancing mass no longer visualized.

## 2021-08-04 NOTE — REASON FOR VISIT
[Post Op: _________] : a [unfilled] post op visit [FreeTextEntry1] : Ms. DENISE FERRARO returns for a follow up visit, generally doing well with no complaints and no fevers or chills at home, generally doing well and pleased with the results of the surgery

## 2021-08-06 LAB
ALBUMIN SERPL ELPH-MCNC: 4.3 G/DL
ALP BLD-CCNC: 113 U/L
ALT SERPL-CCNC: 26 U/L
ANION GAP SERPL CALC-SCNC: 15 MMOL/L
AST SERPL-CCNC: 23 U/L
BILIRUB SERPL-MCNC: <0.2 MG/DL
BUN SERPL-MCNC: 14 MG/DL
CALCIUM SERPL-MCNC: 10.2 MG/DL
CANCER AG125 SERPL-ACNC: 5 U/ML
CANCER AG19-9 SERPL-ACNC: <2 U/ML
CHLORIDE SERPL-SCNC: 102 MMOL/L
CO2 SERPL-SCNC: 27 MMOL/L
CREAT SERPL-MCNC: 0.72 MG/DL
FERRITIN SERPL-MCNC: 138 NG/ML
FOLATE SERPL-MCNC: 12.7 NG/ML
GLUCOSE SERPL-MCNC: 47 MG/DL
IRON SATN MFR SERPL: 26 %
IRON SERPL-MCNC: 83 UG/DL
POTASSIUM SERPL-SCNC: 4.9 MMOL/L
PROT SERPL-MCNC: 6.8 G/DL
SODIUM SERPL-SCNC: 145 MMOL/L
TIBC SERPL-MCNC: 321 UG/DL
TSH SERPL-ACNC: 1.93 UIU/ML
UIBC SERPL-MCNC: 239 UG/DL
VIT B12 SERPL-MCNC: 1721 PG/ML

## 2021-08-18 ENCOUNTER — APPOINTMENT (OUTPATIENT)
Dept: HEMATOLOGY ONCOLOGY | Facility: CLINIC | Age: 56
End: 2021-08-18

## 2021-08-22 LAB
CANCER AG15-3 SERPL-ACNC: 15.9 U/ML
CEA SERPL-MCNC: 1.7 NG/ML

## 2021-10-26 ENCOUNTER — APPOINTMENT (OUTPATIENT)
Dept: PLASTIC SURGERY | Facility: HOSPITAL | Age: 56
End: 2021-10-26

## 2021-10-29 ENCOUNTER — APPOINTMENT (OUTPATIENT)
Dept: PLASTIC SURGERY | Facility: HOSPITAL | Age: 56
End: 2021-10-29
Payer: MEDICAID

## 2021-10-29 VITALS
DIASTOLIC BLOOD PRESSURE: 78 MMHG | HEART RATE: 88 BPM | TEMPERATURE: 98.4 F | OXYGEN SATURATION: 100 % | RESPIRATION RATE: 20 BRPM | SYSTOLIC BLOOD PRESSURE: 107 MMHG

## 2021-10-29 DIAGNOSIS — Z90.13 ACQUIRED ABSENCE OF BILATERAL BREASTS AND NIPPLES: ICD-10-CM

## 2021-10-29 PROCEDURE — 11921 CORRECT SKN COLOR 6.1-20.0CM: CPT

## 2021-10-29 NOTE — REASON FOR VISIT
[Procedure: _________] : a [unfilled] procedure visit [FreeTextEntry1] : Patient returns for areolar reconstruction with tattoo

## 2021-10-29 NOTE — ASSESSMENT
[FreeTextEntry1] : A:\par Acquired absence bilateral nipple areolar complex\par P:\par Areolar reconstruction with tattoo, chrome 2 and 3\par Patient tolerated well \par Instructions reviewed

## 2021-10-29 NOTE — PROCEDURE
[FreeTextEntry1] : Acquired absence bilateral breasts and nipple areolar complex  [FreeTextEntry2] : Bilateral areolar reconstruction with tattoo [FreeTextEntry3] : Xylocaine 1% without epinephrine  [FreeTextEntry4] : minimal  [FreeTextEntry5] : none  [FreeTextEntry6] : The areola was marked as a 45 mm [place] around the nipple bilaterally.  The patient assisted in choosing chrome 2 and 3 pigment reconstruction.  After a sterile prep, Xylocaine was injected without epinephrine.  Using the dermabrasion machine, tattoo applied in two passes down to punctate bleeding.  \par Patient tolerated well  [FreeTextEntry7] : none

## 2021-11-10 ENCOUNTER — APPOINTMENT (OUTPATIENT)
Dept: BREAST CENTER | Facility: CLINIC | Age: 56
End: 2021-11-10
Payer: MEDICAID

## 2021-11-10 ENCOUNTER — APPOINTMENT (OUTPATIENT)
Dept: HEMATOLOGY ONCOLOGY | Facility: CLINIC | Age: 56
End: 2021-11-10
Payer: MEDICAID

## 2021-11-10 VITALS
WEIGHT: 165 LBS | RESPIRATION RATE: 18 BRPM | BODY MASS INDEX: 26.52 KG/M2 | DIASTOLIC BLOOD PRESSURE: 78 MMHG | SYSTOLIC BLOOD PRESSURE: 120 MMHG | HEIGHT: 66 IN | TEMPERATURE: 98.3 F | OXYGEN SATURATION: 99 % | HEART RATE: 69 BPM

## 2021-11-10 VITALS
HEART RATE: 82 BPM | WEIGHT: 167 LBS | DIASTOLIC BLOOD PRESSURE: 80 MMHG | SYSTOLIC BLOOD PRESSURE: 109 MMHG | HEIGHT: 66 IN | BODY MASS INDEX: 26.84 KG/M2

## 2021-11-10 PROCEDURE — 99214 OFFICE O/P EST MOD 30 MIN: CPT | Mod: 25

## 2021-11-10 PROCEDURE — 93702 BIS XTRACELL FLUID ANALYSIS: CPT | Mod: NC

## 2021-11-10 PROCEDURE — 99213 OFFICE O/P EST LOW 20 MIN: CPT

## 2021-11-10 RX ORDER — PROCHLORPERAZINE MALEATE 10 MG/1
10 TABLET ORAL EVERY 6 HOURS
Qty: 120 | Refills: 5 | Status: DISCONTINUED | COMMUNITY
Start: 2020-05-20 | End: 2021-11-10

## 2021-11-10 RX ORDER — DIPHENHYDRAMINE HYDROCHLORIDE AND LIDOCAINE HYDROCHLORIDE AND ALUMINUM HYDROXIDE AND MAGNESIUM HYDRO
KIT
Qty: 1 | Refills: 5 | Status: DISCONTINUED | COMMUNITY
Start: 2020-09-18 | End: 2021-11-10

## 2021-11-10 RX ORDER — DIPHENHYDRAMINE HYDROCHLORIDE AND LIDOCAINE HYDROCHLORIDE AND ALUMINUM HYDROXIDE AND MAGNESIUM HYDRO
KIT
Qty: 1 | Refills: 5 | Status: DISCONTINUED | COMMUNITY
Start: 2020-07-31 | End: 2021-11-10

## 2021-11-10 RX ORDER — OLANZAPINE 5 MG/1
5 TABLET, FILM COATED ORAL DAILY
Qty: 20 | Refills: 1 | Status: DISCONTINUED | COMMUNITY
Start: 2020-06-12 | End: 2021-11-10

## 2021-11-10 RX ORDER — LIDOCAINE AND PRILOCAINE 25; 25 MG/G; MG/G
2.5-2.5 CREAM TOPICAL AS DIRECTED
Qty: 1 | Refills: 2 | Status: DISCONTINUED | COMMUNITY
Start: 2020-07-24 | End: 2021-11-10

## 2021-11-10 RX ORDER — LIDOCAINE HYDROCHLORIDE 20 MG/ML
2 SOLUTION ORAL; TOPICAL
Qty: 1200 | Refills: 5 | Status: DISCONTINUED | COMMUNITY
Start: 2020-09-18 | End: 2021-11-10

## 2021-11-10 RX ORDER — DEXAMETHASONE 4 MG/1
4 TABLET ORAL
Qty: 15 | Refills: 5 | Status: DISCONTINUED | COMMUNITY
Start: 2020-07-31 | End: 2021-11-10

## 2021-11-10 RX ORDER — ONDANSETRON 8 MG/1
8 TABLET ORAL
Qty: 90 | Refills: 3 | Status: DISCONTINUED | COMMUNITY
Start: 2020-05-20 | End: 2021-11-10

## 2021-11-10 RX ORDER — CEPHALEXIN 500 MG/1
500 TABLET ORAL 3 TIMES DAILY
Qty: 21 | Refills: 0 | Status: DISCONTINUED | COMMUNITY
Start: 2020-12-02 | End: 2021-11-10

## 2021-11-10 RX ORDER — CYCLOBENZAPRINE HYDROCHLORIDE 5 MG/1
5 TABLET, FILM COATED ORAL 3 TIMES DAILY
Qty: 21 | Refills: 0 | Status: DISCONTINUED | COMMUNITY
Start: 2020-12-09 | End: 2021-11-10

## 2021-11-10 RX ORDER — ONDANSETRON 8 MG/1
8 TABLET, ORALLY DISINTEGRATING ORAL
Qty: 30 | Refills: 5 | Status: DISCONTINUED | COMMUNITY
Start: 2020-06-12 | End: 2021-11-10

## 2021-11-10 RX ORDER — LORAZEPAM 0.5 MG/1
0.5 TABLET ORAL
Qty: 30 | Refills: 0 | Status: DISCONTINUED | COMMUNITY
Start: 2020-06-12 | End: 2021-11-10

## 2021-11-10 RX ORDER — LORAZEPAM 0.5 MG/1
0.5 TABLET ORAL
Qty: 30 | Refills: 0 | Status: DISCONTINUED | COMMUNITY
Start: 2020-08-21 | End: 2021-11-10

## 2021-11-10 NOTE — ASSESSMENT
[FreeTextEntry1] : 57 yo female with TNBC bilaterally\par doing well\par rec PT for arm ROM\par cont derm surveillance\par rec PCP\par LDEX in 6 months; today's LDEX completed WNL\par discussed MRI for surveillance\par she will d/w Dr. Copeland scar pain\par We reviewed risk reduction strategies including maintaining a BMI <25, limiting red meat intake and alcoholic beverages to 3 per week and exercise (150 min/ week low intensity or 75 min/week high intensity). And maintaining a normal vitamin D level.\par f/u 6 months\par \par She knows to call or return sooner should any concerns or questions arise.\par \par

## 2021-11-10 NOTE — HISTORY OF PRESENT ILLNESS
[T: ___] : T[unfilled] [N: ___] : N[unfilled] [de-identified] : This is a 54 y/o woman who is otherwise in excellent health, first noticed a "lump" in her breast back in November.  The mass has waxed and waned in size since then. \par The mass has become more painful,but no skin changes or nipple dc. Her last mammo was about 2 years ago. \par Mammogram done in feb 2020 showed a 2.6 cm mass.  Her diagnosis and biopsy was delayed due to covid. Biopsy done  may 5th showed a high grade triple negative ki 67 was 90 %. 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 , (only received one dose) now on taxol weekly alone \par \par MRI reviewed shows iresolution on the left and on the right there is improvement in mass but persistant enhancement to pectoralis and extednign to nipple \par \par Bilateral mastectomy with implant reconstruction scheduled for 11/19/2020 with Rhoda Cottrell and Dinorah\par \par ultrasound ovaries ok \par will plan to do hysterotomy after complete breast surgery  [de-identified] : had radical hysterectomy with Dr. Bita crawford 10/2021 \par feels great\par energy fair, could be better\par due for colon cleansing?\par takes vit d sometimes- does not take on a regular basis \par some times has trouble sleeping \par eating less red meat. eating more vegetables.\par not exercising as much \par saw Dr. Ac Penny f/u in 6 months \par

## 2021-11-10 NOTE — ASSESSMENT
[FreeTextEntry1] : This is a 57 y/o woman with  new diagnosis of triple negative , high grade, breast cancer at least T2Nx. \par \par 1) breast cancer \par -s/p 4 cycles of AC neoadjuvant chemo , s/p carbo taxol x1 then completed 12 doses of taxol alone oct 2020 \par -Status post complete pathological response, 11/20/20 \par -Patient does not meet criteria for the create X trial as she has had a complete pathological response\par -markers negative repeat now \par -Re-Discussed the possibility of parp inhibitor Lynparza for 1 year based on the [employer] data recently published, discussed side effects including but not limited to anemia other cytopenias, fatigue, GI side effects, secondary leukemias.  The role of the Lynparza in the situation this far out from chemotherapy is unclear and especially considering that the patient had a complete pathological response.\par -We also re-discussed the recent approval of Keytruda in the adjuvant treatment of triple negative breast cancer.  Again the role in this case is unclear considering that the patient completed chemotherapy back in October and had surgery in November however I think it should be considered.  Discussed side effects including autoimmune effects on the thyroid, colon, liver and lung.\par -Pt still refuses both options today. Re-emphasized benefits of lynparza with BRCA + patients. She will think about these options, discuss with her family again, and let me know \par - maintain follow up with Dr. Shen. \par \par 2) cardiomyopathy \par resolved\par echo is normal \par \par 3) back pain \par improved\par petct neg \par \par 4) vit d def \par cont vit d 2000 units a day , check level \par dexa due at some point\par \par 5) vit b12 def \par cont  b12, check level today\par \par 6) BRCA positive \par Status post bilateral mastectomy\par will be seeing dr crawford for fall for hysterectomy \par repeat ca 125 and ca 19-9 today \par pt is s/p TSH BSO with Dr. Bita Crawford 10/2021\par Re-advised colonoscopy every 5 years , encouraged her to do now . \par encouraged her to see a dermatologist\par \par 7) constipation \par improved , cont bowel regimen \par \par 8 ) neuropathy \par sig improved\par likely due to chemo \par \par 9) anemia \par improved \par repeat cbc today \par \par 11) insomnia \par consider melatonin\par pt no longer uses marijuana gummies. \par \par dw patient and daughter ( Carlos- via facetime) at length \par All questions were answered to her satisfaction. \par \par Follow-up in 3 months

## 2021-11-10 NOTE — PHYSICAL EXAM
[Normocephalic] : normocephalic [Atraumatic] : atraumatic [Supple] : supple [No Supraclavicular Adenopathy] : no supraclavicular adenopathy [Examined in the supine and seated position] : examined in the supine and seated position [Symmetrical] : symmetrical [No dominant masses] : no dominant masses in right breast  [No dominant masses] : no dominant masses left breast [No Axillary Lymphadenopathy] : no left axillary lymphadenopathy [No Edema] : no edema [No Rashes] : no rashes [No Ulceration] : no ulceration [de-identified] : s/p SSMx and implant bilaterally, no masses, no collections, reconstructed nipple and tattooed, implants soft, in place [de-identified] : at site of patient's pain, IMF 6:00 there is keloid scar

## 2021-11-10 NOTE — PHYSICAL EXAM
[Normal] : affect appropriate [de-identified] : Bilateral mastectomy with implants in place, new nipples in place  no masses or nodules palpable no axillary adenopathy [de-identified] : neuropathy

## 2021-11-10 NOTE — HISTORY OF PRESENT ILLNESS
[FreeTextEntry1] : This is a 56 year old female referred by Dr. Shen for a newly diagnosed right breast cancer. She states she felt a lump since November 2019. It was painful with pressure.\par Her last imaging was 2 years ago in [place] and she doesn't remember where.She started garlic, oregano supplements, essro, vitamin e and super primose supplements since her diagnosis. She has bilateral retropectoral silicone implants which were placed in [facility] [place]. \par \par On February 27, 2020 she presented for imaging for the right breast mass. In the right breast 9:00 a 2.6 cm macro lobulated hypoechoic nodule was seen which correlated with the palpable lump. Cystocele the left breast. On May 5, 2020 she presented for right breast core biopsy and right breast 9:00 6 cm (twirl clip) from the nipple revealed a infiltrating duct carcinoma grade 2 (2nd op path at [facility] felt it was grade 3) with necrosis and papillary features, estrogen 0%-negative, progesterone 0%-negative, HER-2-negative by FISH and IHC, Ki-67 90%. \par \par She is s/p right axillary USGBx 6/1/2020 showed reactive lymph node and left breast 4N6 USGbx showed IDC, grade 9/9, TNBC Ki67 90%.  She completed chemo (ddACT) carboplatin was discontinued due to neuropathy 10/23/2020. She is s/p bilateral SSM and SLNE 11/19/2020 and no residual cancer; right 3 lymph nodes removed and left 2 all negative. Her mediport was removed 4/9/2021 and implants were placed.\par \par She does NOT perform SBE. \par She has not noticed a change in her reconstruction breast.\par She has not noticed a change in her nipple or nipple area.\par She has not noticed a change in the skin of the breast.\par She is not experiencing nipple discharge.\par She is experiencing left breast pain at the scar IMF.\par She has not noticed a lump or lymph node under the armpit. \par \par BREAST CANCER RISK FACTORS\par Menarche: 17\par Date of LMP: 2013\par Menopause: post age 48\par Grav: 4     Para: 4\par Age at first live birth: 21\par Nursed: yes\par Hysterectomy: no\par Oophorectomy: no\par OCP: yes in the past \par HRT: no\par Last pap/pelvic exam: 2019 WNL \par Related family history: none\par Ashkenazi: no \par Mastery risk assessment: n/a\par BRCA testing: BRCA1 positive\par Bra size: \par \par Last mammogram: 2/27/2020                Location:  [place]\par Report reviewed.                                 Images reviewed on PACS\par Results: BIRADS 4\par heterogeneously dense breasts. no evidence of malignancy.\par \par Last ultrasound: 8/11/2020                    Location: \par Report reviewed.                                 Images reviewed on PACS\par Results: BIRADS 6\par marker decrease in size in the known mass in the right 9:00 axis, 6cm from the nipple. Previously seen mass in the left 4:00 axis is not identified on the current study. \par \par Last MRI: 11/3/2020                                 Location: [facility]\par Report reviewed.\par Results:BIRADS 6\par central outer right breast enhancement 3.7cmx1.9cmx2.5cm (was 4.6x3.0x4.8cm) spiculation/NME extends to nipple and posterior to the capsule and pectoralis. \par Left: susceptibility artifact from a biopsy marker in LOQ previously seen enhancing mass no longer visualized.

## 2021-11-12 LAB
25(OH)D3 SERPL-MCNC: 30.7 NG/ML
CANCER AG125 SERPL-ACNC: 6 U/ML
CANCER AG15-3 SERPL-ACNC: 15.9 U/ML
CANCER AG19-9 SERPL-ACNC: <2 U/ML
CEA SERPL-MCNC: 1.2 NG/ML
FERRITIN SERPL-MCNC: 130 NG/ML
FOLATE SERPL-MCNC: 16.1 NG/ML
VIT B12 SERPL-MCNC: 593 PG/ML

## 2022-03-16 ENCOUNTER — LABORATORY RESULT (OUTPATIENT)
Age: 57
End: 2022-03-16

## 2022-03-16 ENCOUNTER — APPOINTMENT (OUTPATIENT)
Dept: HEMATOLOGY ONCOLOGY | Facility: CLINIC | Age: 57
End: 2022-03-16

## 2022-03-16 VITALS
OXYGEN SATURATION: 100 % | TEMPERATURE: 98 F | SYSTOLIC BLOOD PRESSURE: 118 MMHG | RESPIRATION RATE: 18 BRPM | WEIGHT: 164 LBS | HEART RATE: 83 BPM | BODY MASS INDEX: 26.36 KG/M2 | DIASTOLIC BLOOD PRESSURE: 81 MMHG | HEIGHT: 66 IN

## 2022-03-17 ENCOUNTER — NON-APPOINTMENT (OUTPATIENT)
Age: 57
End: 2022-03-17

## 2022-03-18 LAB
CANCER AG125 SERPL-ACNC: 5 U/ML
CANCER AG15-3 SERPL-ACNC: 14.5 U/ML
CANCER AG19-9 SERPL-ACNC: <2 U/ML
CEA SERPL-MCNC: 0.8 NG/ML
FERRITIN SERPL-MCNC: 117 NG/ML
FOLATE SERPL-MCNC: 14.4 NG/ML
TSH SERPL-ACNC: 2.01 UIU/ML
VIT B12 SERPL-MCNC: 680 PG/ML

## 2022-04-05 ENCOUNTER — APPOINTMENT (OUTPATIENT)
Dept: HEMATOLOGY ONCOLOGY | Facility: CLINIC | Age: 57
End: 2022-04-05
Payer: MEDICAID

## 2022-04-05 PROCEDURE — ZZZZZ: CPT

## 2022-04-06 ENCOUNTER — NON-APPOINTMENT (OUTPATIENT)
Age: 57
End: 2022-04-06

## 2022-05-23 ENCOUNTER — APPOINTMENT (OUTPATIENT)
Dept: HEMATOLOGY ONCOLOGY | Facility: CLINIC | Age: 57
End: 2022-05-23
Payer: MEDICAID

## 2022-05-23 VITALS
SYSTOLIC BLOOD PRESSURE: 120 MMHG | WEIGHT: 167 LBS | TEMPERATURE: 98.2 F | BODY MASS INDEX: 26.84 KG/M2 | HEART RATE: 69 BPM | HEIGHT: 66 IN | DIASTOLIC BLOOD PRESSURE: 82 MMHG | RESPIRATION RATE: 18 BRPM | OXYGEN SATURATION: 100 %

## 2022-05-23 PROCEDURE — 99214 OFFICE O/P EST MOD 30 MIN: CPT

## 2022-05-23 NOTE — REVIEW OF SYSTEMS
[Shortness Of Breath] : shortness of breath [Negative] : Allergic/Immunologic [FreeTextEntry6] : with exertion only , not worsening , no lower ext edema

## 2022-05-23 NOTE — HISTORY OF PRESENT ILLNESS
[T: ___] : T[unfilled] [N: ___] : N[unfilled] [de-identified] : This is a 56 y/o woman who is otherwise in excellent health, first noticed a "lump" in her breast back in November.  The mass has waxed and waned in size since then. \par The mass has become more painful,but no skin changes or nipple dc. Her last mammo was about 2 years ago. \par Mammogram done in feb 2020 showed a 2.6 cm mass.  Her diagnosis and biopsy was delayed due to covid. Biopsy done  may 5th showed a high grade triple negative ki 67 was 90 %. 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 , (only received one dose) now on taxol weekly alone \par \par MRI reviewed shows iresolution on the left and on the right there is improvement in mass but persistant enhancement to pectoralis and extednign to nipple \par \par Bilateral mastectomy with implant reconstruction scheduled for 11/19/2020 with Rhoda Cottrell and Dinorah\par \par ultrasound ovaries ok \par will plan to do hysterotomy after complete breast surgery  [de-identified] : \par itching around port scar \par shooting pain in breasts only fleeting \par mri of abd negative for masses or lesions aprl 2022 \par transvaginal us negative  april 2022  done for vaginal dc , saw dr crawford , all was ok \par pt did already have hysterectomy \par colonosocpy not up todate \par cont to have sob on exertion , did not do echo or ct scan yet \par needs medical mj for sleep

## 2022-05-23 NOTE — PHYSICAL EXAM
[Normal] : affect appropriate [de-identified] : Bilateral mastectomy with implants in place, new nipples in place  no masses or nodules palpable no axillary adenopathy., + keloid bilat [de-identified] : neuropathy

## 2022-05-23 NOTE — ASSESSMENT
[FreeTextEntry1] : This is a 54 y/o woman with  new diagnosis of triple negative , high grade, breast cancer at least T2Nx. \par \par 1) breast cancer \par -s/p 4 cycles of AC neoadjuvant chemo , s/p carbo taxol x1 then completed 12 doses of taxol alone oct 2020 \par -Status post complete pathological response, 11/20/20 \par -Patient does not meet criteria for the create X trial as she has had a complete pathological response\par -markers negative repeat now \par - patient also does not really qualify for keytruda in adjuvant setting as she did not receive neoadjuvant , and futhermore she had a complete response \par -we also discussed lymparza, but again given complete response the role is unclear \par -according to [facility] , the only option post neoadjuvant with pCR is keytruda \par -repeat cea and ca 15-3 \par -referred to PT for occasional breast pain \par - may be able to do steroid injections for keloid , needs to see derm but check with dr sanchez first \par -no need for imaging \par \par 2) SOB on exertion \par ct chest now \par echo now \par \par 3) back pain \par petct neg \par \par 4) vit d def \par cont vit d 2000 units a day , check level \par needs dexa at some point \par \par 5) vit b12 def \par cont  b12, check level \par \par 6) BRCA positive \par Status post bilateral mastectomy\par s/p hystectomy , s/p  tv ultrasound for dc , s/p gyn eval , all negative \par repeat ca 125 and ca 19-9 today  \par patient NEEDS to do colonoscopy now , discussed the risk of colon cancer is hgher with BRCA Pos \par encouraged her to see a dermatologist\par mri of abd negative for any pancreatic lesions april 2022 , dw patient \par \par 7) constipation \par improved , cont bowel regimen \par needs to have colonosocpy dw patient \par \par 8 ) neuropathy \par likely due to chemo \par Continues to improve\par \par 9) anemia \par improved \par repeat cbc today \par \par 11) insomnia \par Ativan prn , medical cannibis prn \par \par dw patient and son at length \par \par Follow-up in 3 months

## 2022-05-27 LAB
25(OH)D3 SERPL-MCNC: 40.2 NG/ML
ALBUMIN SERPL ELPH-MCNC: 4.5 G/DL
ALP BLD-CCNC: 118 U/L
ALT SERPL-CCNC: 35 U/L
ANION GAP SERPL CALC-SCNC: 20 MMOL/L
AST SERPL-CCNC: 28 U/L
BILIRUB SERPL-MCNC: 0.3 MG/DL
BUN SERPL-MCNC: 12 MG/DL
CALCIUM SERPL-MCNC: 9.8 MG/DL
CANCER AG125 SERPL-ACNC: 6 U/ML
CANCER AG15-3 SERPL-ACNC: 14.7 U/ML
CANCER AG19-9 SERPL-ACNC: <2 U/ML
CEA SERPL-MCNC: 1.4 NG/ML
CHLORIDE SERPL-SCNC: 98 MMOL/L
CO2 SERPL-SCNC: 24 MMOL/L
CREAT SERPL-MCNC: 0.67 MG/DL
EGFR: 103 ML/MIN/1.73M2
FERRITIN SERPL-MCNC: 104 NG/ML
FOLATE SERPL-MCNC: 11.1 NG/ML
GLUCOSE SERPL-MCNC: 52 MG/DL
IRON SATN MFR SERPL: 33 %
IRON SERPL-MCNC: 106 UG/DL
POTASSIUM SERPL-SCNC: 4.4 MMOL/L
PROT SERPL-MCNC: 7.4 G/DL
SODIUM SERPL-SCNC: 142 MMOL/L
TIBC SERPL-MCNC: 323 UG/DL
TSH SERPL-ACNC: 1.92 UIU/ML
UIBC SERPL-MCNC: 217 UG/DL
VIT B12 SERPL-MCNC: 597 PG/ML

## 2022-06-06 ENCOUNTER — APPOINTMENT (OUTPATIENT)
Dept: BREAST CENTER | Facility: CLINIC | Age: 57
End: 2022-06-06
Payer: MEDICAID

## 2022-06-06 VITALS
DIASTOLIC BLOOD PRESSURE: 79 MMHG | BODY MASS INDEX: 26.84 KG/M2 | WEIGHT: 167 LBS | SYSTOLIC BLOOD PRESSURE: 113 MMHG | HEIGHT: 66 IN | HEART RATE: 75 BPM

## 2022-06-06 PROCEDURE — 99215 OFFICE O/P EST HI 40 MIN: CPT | Mod: 25

## 2022-06-06 PROCEDURE — 93702 BIS XTRACELL FLUID ANALYSIS: CPT | Mod: NC

## 2022-06-06 RX ORDER — GABAPENTIN 100 MG/1
100 CAPSULE ORAL
Qty: 30 | Refills: 5 | Status: DISCONTINUED | COMMUNITY
Start: 2020-12-16 | End: 2022-06-06

## 2022-06-06 NOTE — HISTORY OF PRESENT ILLNESS
[FreeTextEntry1] : This is a 57 year old female referred by Dr. Shen for a newly diagnosed right breast cancer. She states she felt a lump since November 2019. It was painful with pressure.\par Her last imaging was 2 years ago in [place] and she doesn't remember where.She started garlic, oregano supplements, essro, vitamin e and super primose supplements since her diagnosis. She has bilateral retropectoral silicone implants which were placed in [facility] 2011. \par \par On February 27, 2020 she presented for imaging for the right breast mass. In the right breast 9:00 a 2.6 cm macro lobulated hypoechoic nodule was seen which correlated with the palpable lump. Cystocele the left breast. On May 5, 2020 she presented for right breast core biopsy and right breast 9:00 6 cm (twirl clip) from the nipple revealed a infiltrating duct carcinoma grade 2 (2nd op path at [facility] felt it was grade 3) with necrosis and papillary features, estrogen 0%-negative, progesterone 0%-negative, HER-2-negative by FISH and IHC, Ki-67 90%. \par \par She is s/p right axillary USGBx 6/1/2020 showed reactive lymph node and left breast 4N6 USGbx showed IDC, grade 9/9, TNBC Ki67 90%.  She completed chemo (ddACT) carboplatin was discontinued due to neuropathy 10/23/2020. She is s/p bilateral SSM and SLNE 11/19/2020 and no residual cancer; right 3 lymph nodes removed and left 2 all negative. Her mediport was removed 4/9/2021 and implants were placed.\par \par She does NOT perform SBE. \par She has not noticed a change in her reconstruction breast.\par She has not noticed a change in her nipple or nipple area.\par She has not noticed a change in the skin of the breast.\par She is not experiencing nipple discharge.\par She is experiencing left breast pain at the scar IMF.\par She has not noticed a lump or lymph node under the armpit. \par \par BREAST CANCER RISK FACTORS\par Menarche: 17\par Date of LMP: 2013\par Menopause: post age 48\par Grav: 4     Para: 4\par Age at first live birth: 21\par Nursed: yes\par Hysterectomy: no\par Oophorectomy: no\par OCP: yes in the past \par HRT: no\par Last pap/pelvic exam: 2019 WNL \par Related family history: none\par Ashkenazi: no \par Mastery risk assessment: n/a\par BRCA testing: BRCA1 positive\par Bra size: \par \par Last mammogram: 2/27/2020                Location:  [place]\par Report reviewed.                                 Images reviewed on PACS\par Results: BIRADS 4\par heterogeneously dense breasts. no evidence of malignancy.\par \par Last ultrasound: 8/11/2020                    Location: \par Report reviewed.                                 Images reviewed on PACS\par Results: BIRADS 6\par marker decrease in size in the known mass in the right 9:00 axis, 6cm from the nipple. Previously seen mass in the left 4:00 axis is not identified on the current study. \par \par Last MRI: 11/3/2020                                 Location: [facility]\par Report reviewed.\par Results:BIRADS 6\par central outer right breast enhancement 3.7cmx1.9cmx2.5cm (was 4.6x3.0x4.8cm) spiculation/NME extends to nipple and posterior to the capsule and pectoralis. \par Left: susceptibility artifact from a biopsy marker in LOQ previously seen enhancing mass no longer visualized.

## 2022-06-06 NOTE — ASSESSMENT
[FreeTextEntry1] : 58 yo female with TNBC bilaterally\par doing well\par rec PT for arm ROM\par cont derm surveillance\par rec PCP\par LDEX today WNL and now prn\par discussed MRI for surveillance in 2023/2024 she states she had pet ct scan last month\par \par We reviewed risk reduction strategies including maintaining a BMI <25, limiting red meat intake and alcoholic beverages to 3 per week and exercise (150 min/ week low intensity or 75 min/week high intensity). And maintaining a normal vitamin D level.\par f/u 6 months\par \par She knows to call or return sooner should any concerns or questions arise.\par \par

## 2022-06-06 NOTE — PHYSICAL EXAM
[Normocephalic] : normocephalic [Atraumatic] : atraumatic [Supple] : supple [No Supraclavicular Adenopathy] : no supraclavicular adenopathy [Examined in the supine and seated position] : examined in the supine and seated position [Symmetrical] : symmetrical [No dominant masses] : no dominant masses in right breast  [No dominant masses] : no dominant masses left breast [No Axillary Lymphadenopathy] : no left axillary lymphadenopathy [No Edema] : no edema [No Rashes] : no rashes [No Ulceration] : no ulceration [de-identified] : s/p SSMx and implant bilaterally, no masses, no collections, reconstructed nipple and tattooed, implants soft, in place [de-identified] : at site of patient's pain, IMF 6:00 there is keloid scar

## 2022-08-17 ENCOUNTER — APPOINTMENT (OUTPATIENT)
Dept: HEMATOLOGY ONCOLOGY | Facility: CLINIC | Age: 57
End: 2022-08-17

## 2022-08-17 ENCOUNTER — RESULT REVIEW (OUTPATIENT)
Age: 57
End: 2022-08-17

## 2022-08-17 VITALS
WEIGHT: 165 LBS | HEART RATE: 84 BPM | BODY MASS INDEX: 26.52 KG/M2 | RESPIRATION RATE: 18 BRPM | HEIGHT: 66 IN | TEMPERATURE: 98.8 F | OXYGEN SATURATION: 99 % | SYSTOLIC BLOOD PRESSURE: 108 MMHG | DIASTOLIC BLOOD PRESSURE: 70 MMHG

## 2022-08-17 DIAGNOSIS — M54.9 DORSALGIA, UNSPECIFIED: ICD-10-CM

## 2022-08-17 PROCEDURE — 99214 OFFICE O/P EST MOD 30 MIN: CPT

## 2022-08-18 LAB
25(OH)D3 SERPL-MCNC: 38.3 NG/ML
B BURGDOR AB SER-IMP: NEGATIVE
B BURGDOR IGG+IGM SER QL: 0.12 INDEX
CANCER AG125 SERPL-ACNC: 5 U/ML
CANCER AG15-3 SERPL-ACNC: 14.4 U/ML
CANCER AG19-9 SERPL-ACNC: <2 U/ML
CEA SERPL-MCNC: 1.2 NG/ML
FERRITIN SERPL-MCNC: 96 NG/ML
FOLATE SERPL-MCNC: 11.1 NG/ML
IRON SATN MFR SERPL: 27 %
IRON SERPL-MCNC: 91 UG/DL
TIBC SERPL-MCNC: 335 UG/DL
UIBC SERPL-MCNC: 244 UG/DL
VIT B12 SERPL-MCNC: 658 PG/ML

## 2022-11-02 ENCOUNTER — RESULT REVIEW (OUTPATIENT)
Age: 57
End: 2022-11-02

## 2022-11-02 ENCOUNTER — APPOINTMENT (OUTPATIENT)
Dept: PLASTIC SURGERY | Facility: CLINIC | Age: 57
End: 2022-11-02

## 2022-11-02 ENCOUNTER — TRANSCRIPTION ENCOUNTER (OUTPATIENT)
Age: 57
End: 2022-11-02

## 2022-11-02 ENCOUNTER — APPOINTMENT (OUTPATIENT)
Dept: HEMATOLOGY ONCOLOGY | Facility: CLINIC | Age: 57
End: 2022-11-02

## 2022-11-02 VITALS
HEIGHT: 66 IN | DIASTOLIC BLOOD PRESSURE: 80 MMHG | BODY MASS INDEX: 25.88 KG/M2 | SYSTOLIC BLOOD PRESSURE: 116 MMHG | WEIGHT: 161 LBS | TEMPERATURE: 98.6 F | RESPIRATION RATE: 18 BRPM | OXYGEN SATURATION: 99 % | HEART RATE: 76 BPM

## 2022-11-02 VITALS
SYSTOLIC BLOOD PRESSURE: 110 MMHG | OXYGEN SATURATION: 100 % | TEMPERATURE: 98.3 F | DIASTOLIC BLOOD PRESSURE: 78 MMHG | HEART RATE: 82 BPM | RESPIRATION RATE: 20 BRPM

## 2022-11-02 DIAGNOSIS — R74.8 ABNORMAL LEVELS OF OTHER SERUM ENZYMES: ICD-10-CM

## 2022-11-02 DIAGNOSIS — L90.5 SCAR CONDITIONS AND FIBROSIS OF SKIN: ICD-10-CM

## 2022-11-02 PROCEDURE — 99214 OFFICE O/P EST MOD 30 MIN: CPT | Mod: 25

## 2022-11-02 PROCEDURE — 99212 OFFICE O/P EST SF 10 MIN: CPT

## 2022-11-02 PROCEDURE — 36415 COLL VENOUS BLD VENIPUNCTURE: CPT

## 2022-11-02 RX ORDER — COVID-19 MOLECULAR TEST ASSAY
KIT MISCELLANEOUS
Qty: 1 | Refills: 0 | Status: DISCONTINUED | COMMUNITY
Start: 2022-07-14

## 2022-11-02 NOTE — ASSESSMENT
[FreeTextEntry1] : A:\par Hypertrophic scar longitudinal breast scar\par P:\par Kenalog 10 mg sterile injection\par mepiform to scar

## 2022-11-02 NOTE — REASON FOR VISIT
[Follow-Up: _____] : a [unfilled] follow-up visit [FreeTextEntry1] : Patient returns with painful scar longitudinal limb on the left side

## 2022-11-04 ENCOUNTER — NON-APPOINTMENT (OUTPATIENT)
Age: 57
End: 2022-11-04

## 2022-11-07 LAB
25(OH)D3 SERPL-MCNC: 42.6 NG/ML
CANCER AG125 SERPL-ACNC: 6 U/ML
CANCER AG15-3 SERPL-ACNC: 14.2 U/ML
CANCER AG19-9 SERPL-ACNC: <2 U/ML
CEA SERPL-MCNC: 1.3 NG/ML
FOLATE SERPL-MCNC: 14.8 NG/ML
VIT B12 SERPL-MCNC: 916 PG/ML

## 2022-11-13 NOTE — REASON FOR VISIT
[Follow-Up Visit] : a follow-up [Family Member] : family member [FreeTextEntry2] : Here for follow up of breast cancer

## 2022-11-13 NOTE — ASSESSMENT
[FreeTextEntry1] : This is a 56 y/o woman with  new diagnosis of triple negative , high grade, breast cancer at least T2Nx. \par \par # breast cancer \par -s/p 4 cycles of AC neoadjuvant chemo , s/p carbo taxol x1 then completed 12 doses of taxol alone oct 2020 \par -Status post complete pathological response, 11/20/20 \par -Patient does not meet criteria for the create X trial as she has had a complete pathological response\par -markers negative repeat now \par - patient also does not really qualify for keytruda in adjuvant setting as she did not receive neoadjuvant , and futhermore she had a complete response \par -we also discussed lymparza, but again given complete response the role is unclear \par -according to NCCN , the only option post neoadjuvant with pCR is keytruda \par -repeat cea and ca 15-3 \par -referred to PT for occasional breast pain - pt also advised to see Dr Karen Copeland \par - may be able to do steroid injections for keloid , needs to see derm but check with dr Copeland first \par -no need for imaging \par \par # SOB on exertion \par improved. \par did not do CT chest nor ECHO \par \par # back pain \par petct neg 2020\par was better for a while now producible with some point tenderness \par check Xray \par \par # vit d def \par cont vit d 2000 units a day , check level \par needs dexa at some point \par \par # vit b12 def \par cont  b12, check level \par \par # BRCA 1 positive - \par Status post bilateral mastectomy\par s/p hystectomy , s/p  tv ultrasound for dc , s/p gyn eval , all negative \par repeat ca 125 and ca 19-9 today  \par patient NEEDS to do colonoscopy now , discussed the risk of colon cancer is hgher with BRCA Pos \par encouraged her to see a dermatologist\par mri of abd negative for any pancreatic lesions April 2022 , dw patient \par \par # constipation \par improved , cont bowel regimen \par needs to have colonoscopy dw patient \par \par # neuropathy \par likely due to chemo \par not worse \par \par 9) insomnia \par Ativan prn , medical cannibis prn \par \par 10) varicose veins \par vascular referral\par \par dw patient and daughter at length \par Follow-up in 3 months

## 2022-11-13 NOTE — REVIEW OF SYSTEMS
[Fatigue] : fatigue [SOB on Exertion] : shortness of breath during exertion [Joint Pain] : joint pain [Joint Stiffness] : joint stiffness [Anxiety] : anxiety [Depression] : depression [Hot Flashes] : hot flashes [Negative] : Allergic/Immunologic [Fever] : no fever [Chills] : no chills [Night Sweats] : no night sweats [Wheezing] : no wheezing [Shortness Of Breath] : no shortness of breath [Cough] : no cough [FreeTextEntry9] : pronounced to feet and fingers [de-identified] : see interval hx [de-identified] : neuropathy

## 2022-11-13 NOTE — HISTORY OF PRESENT ILLNESS
[T: ___] : T[unfilled] [N: ___] : N[unfilled] [de-identified] : This is a 54 y/o woman who is otherwise in excellent health, first noticed a "lump" in her breast back in November.  The mass has waxed and waned in size since then. \par The mass has become more painful,but no skin changes or nipple dc. Her last mammo was about 2 years ago. \par Mammogram done in feb 2020 showed a 2.6 cm mass.  Her diagnosis and biopsy was delayed due to covid. Biopsy done  may 5th showed a high grade triple negative ki 67 was 90 %. \par Patient has not had mri of breast or any systemic imaging or genetics \par Saw breast surgeon dr kumar and MRI as well as neoadjuvant chemo recommended \par \par Started ddAC  june 5, 2020 \par \par BRCA1 positive\par PETCT negative for mets \par echo is normal may 2020 \par \par biopsy of contralat breast is positive for triple negative breast \par \par 08/14/2020\par started carboplatin and taxol so far , no allergic reaction \par \par 8/11/20  ultrasound revealing for resolved  left mass and sig improved right breast mass now 1.6 cm) \par \par \par Carbo dc due to rapidly progressive neuropathy , (only received one dose) now on taxol weekly alone \par \par MRI reviewed shows iresolution on the left and on the right there is improvement in mass but persistant enhancement to pectoralis and extednign to nipple \par \par Bilateral mastectomy with implant reconstruction scheduled for 11/19/2020 with Rhoda Cottrell and Dinorah\par \par ultrasound ovaries ok \par will plan to do hysterotomy after complete breast surgery  [de-identified] :  Here today for follow up of breast cancer\par over all  patient is feeling well,occasionally feels tired\par not currently on treatment\par c/o pains in soles of feet\par no recent testing\par hysterectomy oct 7 2021\par ,2021 mastectomy\par rightlaxilla lymph nodes \par interpretor  [id number] used

## 2022-12-01 ENCOUNTER — RESULT REVIEW (OUTPATIENT)
Age: 57
End: 2022-12-01

## 2022-12-01 ENCOUNTER — APPOINTMENT (OUTPATIENT)
Dept: HEMATOLOGY ONCOLOGY | Facility: CLINIC | Age: 57
End: 2022-12-01

## 2022-12-01 VITALS
SYSTOLIC BLOOD PRESSURE: 100 MMHG | WEIGHT: 160 LBS | BODY MASS INDEX: 25.71 KG/M2 | HEIGHT: 66 IN | DIASTOLIC BLOOD PRESSURE: 68 MMHG | OXYGEN SATURATION: 99 % | HEART RATE: 80 BPM | TEMPERATURE: 98.2 F | RESPIRATION RATE: 18 BRPM

## 2022-12-02 ENCOUNTER — NON-APPOINTMENT (OUTPATIENT)
Age: 57
End: 2022-12-02

## 2022-12-02 PROBLEM — R74.8 ELEVATED SERUM GGT LEVEL: Status: ACTIVE | Noted: 2022-12-02

## 2022-12-02 PROBLEM — R74.8 ELEVATED ALKALINE PHOSPHATASE LEVEL: Status: ACTIVE | Noted: 2022-12-02

## 2022-12-22 ENCOUNTER — APPOINTMENT (OUTPATIENT)
Dept: HEMATOLOGY ONCOLOGY | Facility: CLINIC | Age: 57
End: 2022-12-22
Payer: MEDICAID

## 2022-12-22 PROCEDURE — 99204 OFFICE O/P NEW MOD 45 MIN: CPT | Mod: 95

## 2023-01-08 NOTE — ASSESSMENT
[FreeTextEntry1] : The visit was provided via telehealth using real-time 2-way audio visual technology. The patient, Meagan Rosen, [place] [address] at the time of the visit. The Genetic Counselor, Melodie Rasheed, was located at the medical office located in [place], [place] at the time of the visit. The Physician, Dr. Kennedy Paula, was located in [place], [place] at the time of the visit. The patient, Meagan Rosen, and both providers, Melodie Rasheed and Dr. Kennedy Paula, participated in the telehealth encounter. [employer] [place] Interpreters also participated. Consent for telehealth services was given on 22 by the patient, Meagan Rosen. \par \par REASON FOR CONSULT\par Meagan Rosen is a 57-year-old female who was referred by Dr. Jackelyn Guillaume for cancer genetic counseling and a discussion regarding her genetic testing results related to hereditary cancer predisposition. This consultation was carried out with the aid of a [place] interpreters #[id number], #[id number], #[id number], and #[id number].\par \par RELEVANT MEDICAL HISTORY\par Ms. Rosen was diagnosed with breast cancer in  at age 55.  She was treated with neoadjuvant chemotherapy and is s/p bilateral mastectomy with implant reconstruction. She pursued genetic testing using [employer]’s Common Hereditary Cancer Panel, and a pathogenic mutation was detected in the BRCA1 gene ([id number]; [id number]). This test was ordered by Dr. Cheyenne Anderson and reported on 2020.\par \par Ms. Rosen also has a family history of cancer as noted in the pedigree below.\par \par OTHER MEDICAL AND SURGICAL HISTORY:\par Medical: chemotherapy induced neuropathy\par Surgical: Hysterectomy with Bilateral Oophorectomy (), Bilateral Tubal Ligation, Breast Augmentation with Mastectomy, silicone implants \par \par PAST OB/GYN HISTORY:\par Obstetrical History: \par Age at Menarche: 17\par Menopausal with LMP  (48) \par Age at First Live Birth: 21\par Oral Contraceptive Use: None\par Hormone Replacement Therapy: None\par \par CANCER SCREENING HISTORY:  \par Breast: \par •	Mammo/sono: 2020; Results: hypoechoic nodule in R-breast, correlating with palpable lump, US-guided biopsy rec\par •	Most recent sono: 2020 bilateral; Results: decrease in size in known R-mass, previously seen L-mass is not identified; BIRADS6 known biopsy proven malignancy\par •	breast MRI: 2020 and 11/3/2020 bilateral; Results: BIRADS6 known biopsy proven malignancy\par •	Breast Biopsies: R-breast biopsy 2020 (IDC), R-axilla biopsy negative for carcinoma (2020), L 2020 (IDC)\par GYN:\par •	Most recent GYN visit: ; not since surgery\par •	Most recent pap smear: unkn; Results: reported normal\par •	Most recent transvaginal ultrasound: 2022; Results: Impression: Status post hysterectomy and bilateral oophorectomy. No visualized pelvic mass\par •	Most recent CA-125: 2022; Results: 6 (wnl)\par Colon: Scheduled for first colonoscopy in 2023\par Skin: No prior skin exam\par Other: Abdominal MRI: 2022, results: no MR evidence of pancreatic mass\par \par SOCIAL HISTORY:\par •	Former \par •	Tobacco-product use: Never smoker\par •	Environmental Exposures: Denied\par \par FAMILY HISTORY:\par Maternal and paternal ancestry was reported as Y[place]. No Ashkenazi Nondenominational ancestry reported. Consanguinity denied. A detailed family history of cancer was ascertained, see below and scanned chart for pedigree. \par \par To Ms. Rosen’s knowledge, no one in the family has had germline testing for cancer susceptibility.  \par \par RESULTS INTERPRETATION AND ASSESSMENT: \par We informed Ms. Rosen that she tested positive for a pathogenic mutation in the BRCA1 gene. 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 HBOC is an autosomal-dominant inherited cancer predisposition syndrome. Ms. Rosen was informed that individuals with a pathogenic BRCA1 mutation have increased risks for the following: \par  \par FEMALE BREAST CANCER RISK: \par Lifetime risk to develop female breast cancer is estimated to be 65-79% compared to the general population risk of 12.9%. \par  \par MALE BREAST CANCER RISK: \par Lifetime risk to develop male breast cancer is estimated to be up to 5% compared to the general population risk of <1%  \par  \par OVARIAN CANCER RISK: \par Lifetime risk to develop ovarian cancer is estimated to be 36-53% compared to the general population risk of 1.2%.  \par  \par PROSTATE CANCER RISK: \par Risk to develop prostate cancer by age 65 is estimated to be 5-7%. Risk by age 85 is estimated to be 17-19% compared to the general population risk of 12.1% (Sushma et al. 2020,  Urology, estimates derived from adjusted numbers to account for higher prostate cancer risk estimates for men undergoing PSA screening at regular intervals). \par  \par PANCREATIC CANCER RISK: \par Lifetime risk to develop pancreatic cancer is estimated to be up to 3% compared to the general population risk of 1.6%.  \par  \par MELANOMA RISK: \par Melanoma is not known to be associated with BRCA1, however the current National Comprehensive Cancer Network (NCCN) Guidelines do not distinguish between BRCA1 and BRCA2 surveillance and recommends screening, as outlined below.  \par  \par IMPLICATIONS FOR THE PATIENT: \par Given Ms. Rosen’s personal and current reported family history of cancer, and her BRCA1 positive genetic test results, the following screening guidelines and risk-reducing recommendations were discussed: \par   \par BREAST: Ms. Rosen is s/p bilateral mastectomy. Long-term surveillance should be based on Ms. Rosen’s post-treatment protocol as recommended by her oncology team.\par  \par OVARIAN: \par Ms. Rosen is s/p hysterectomy with bilateral oophorectomy. Ms. Rosen’s should undergo age-appropriate gynecologic follow-up.\par  \par MELANOMA: \par - No specific screening guidelines exist, however, general melanoma risk management is appropriate, such as a full body skin examination by a physician and minimizing UV exposure. Ms. Rosen was encouraged to see a dermatologist annually.  \par  \par PANCREATIC: \par - Investigational pancreatic cancer screening may be considered for individuals with a pathogenic BRCA1 mutation and a family history of pancreatic cancer (first or second degree relative) beginning at age 50 (or 10 years younger than earliest diagnosis) in the setting of an experienced high-volume center, ideally under research conditions. \par - Given Ms. Rosen’s current reported family history, screening is not recommended at this time.  \par  \par OTHER: \par - In the absence of other indications, Ms. Rosen should practice age-appropriate cancer screening of other organ systems as recommended for the general population. \par  \par IMPLICATIONS FOR FAMILY MEMBERS: \par This mutation is inherited in an autosomal dominant pattern. We recommend the patient’s first-degree relatives, specifically her mother, siblings and children, pursue genetic counseling and genetic testing as there is a 50% chance they also have the same mutation. Ms. Rosen was made aware that if any at-risk relatives wanted to pursue genetic testing any time in the future, we would be happy to see them and coordinate testing. If they are not local, they can locate a genetic counselor using the National Society of Genetic Counselors, Find a Genetic Counselor Tool (www.nsgc.org/findageneticcounselor). Of note, Ms. Rosen explained that her mother resides in [facility], and her half-siblings reside in the [facility] [place].\par  \par The risk of passing on this mutation to a future generation is 50%. We recommend that the patient’s children pursue genetic counseling and genetic testing. We are available to see them and coordinate testing.  \par  \par REPRODUCTIVE IMPLICATIONS\par The risk of passing on this mutation to a future generation is 50%.\par  \par Ms. Rosen was informed that there have been a few case reports of individuals with biallelic mutations in the BRCA1 gene having Fanconi-like conditions. Fanconi anemia (FA) is an autosomal recessive condition characterized by physical abnormalities (i.e. short statures, skeletal malformations), bone marrow failure and increased risk for acute myeloid leukemia and other malignancies. For those of reproductive age, we recommend the partner of an individual who is a BRCA1 carrier also have BRCA1 genetic testing to assess the risk of having a child affected with this condition if it would inform reproductive decision making. If both individuals carry a single BRCA1 mutation, there may be up to a 25% chance for each pregnancy to be affected with a FA-like condition.  \par  \par RESOURCES & SUPPORT GROUPS \par Facing Our Risk of cancer Empowered (FORCE): www.FacingOurRisk.org   \par [place]: www.BrightP[place].org  \par [place]: www.sharsheret.org  \par   \par In addition, the oncology social workers at [facility] are available to assist with more referrals, if necessary. \par \par PLAN:\par 1.	See above note for recommended management.\par 2.	We encouraged sharing these results with family members. They have a risk to have inherited the same mutation. Other family may benefit from genetic testing and should contact a certified genetic counselor specializing in cancer. Due to HIPAA and [place] laws, Genetics is unable to directly contact other family at risk, but we are available should family members wish to reach out to us\par 3.	Family support resources and referrals were provided. \par 4.	Clinic note and copy of genetic test report will be mailed to Ms. Rosen. \par 5.	Patient informed consult note(s) will be available through their [facility] patient portal.\par 6.	Genetic knowledge changes rapidly. We encouraged re-contacting Cancer Genetics every 2-3 years for any changes in screening recommendations or sooner if there are significant changes in personal or family history\par \par For any additional questions please call Cancer Genetics at [phone]. \par \par \par Melodie Rasheed, MS\par Genetic Counselor, Cancer Genetics\par \par \par Attending Attestation:\par \par I have reviewed and edited the genetic counselor's note and I agree with the assessment and plan as documented. I spent approximately 45 minutes in total time of which approximately 25-30 minutes was face-to-face (via 2-way audiovisual telemedicine connection) with Ms. Rosen reviewing her relevant personal and family history, the genetic testing results, our risk-assessment, and options for future cancer risk-reduction for the patient and her relevant family members. Over half this time was spent in counseling and coordination of care.\par \par Kennedy Paula MD\par Chief, Cancer Genetics\par [facility]\par \par \par CC: \par Patient\par Dr. Jackelyn Guillaume

## 2023-01-09 ENCOUNTER — APPOINTMENT (OUTPATIENT)
Dept: BREAST CENTER | Facility: CLINIC | Age: 58
End: 2023-01-09
Payer: MEDICAID

## 2023-01-09 VITALS
HEART RATE: 83 BPM | WEIGHT: 162 LBS | SYSTOLIC BLOOD PRESSURE: 121 MMHG | BODY MASS INDEX: 26.03 KG/M2 | DIASTOLIC BLOOD PRESSURE: 81 MMHG | HEIGHT: 66 IN

## 2023-01-09 DIAGNOSIS — Z78.9 OTHER SPECIFIED HEALTH STATUS: ICD-10-CM

## 2023-01-09 DIAGNOSIS — R92.8 OTHER ABNORMAL AND INCONCLUSIVE FINDINGS ON DIAGNOSTIC IMAGING OF BREAST: ICD-10-CM

## 2023-01-09 DIAGNOSIS — R92.2 INCONCLUSIVE MAMMOGRAM: ICD-10-CM

## 2023-01-09 DIAGNOSIS — Z92.21 PERSONAL HISTORY OF ANTINEOPLASTIC CHEMOTHERAPY: ICD-10-CM

## 2023-01-09 PROCEDURE — 99215 OFFICE O/P EST HI 40 MIN: CPT

## 2023-01-09 NOTE — HISTORY OF PRESENT ILLNESS
[FreeTextEntry1] : This is a 57 year old female referred by Dr. Shen for a newly diagnosed right breast cancer. She states she felt a lump since November 2019. It was painful with pressure.\par \par Her last imaging was 2 years ago in [place] and she doesn't remember where.She started garlic, oregano supplements, essro, vitamin e and super primose supplements since her diagnosis. She has bilateral retropectoral silicone implants which were placed in [facility] 2011. \par \par On February 27, 2020 she presented for imaging for the right breast mass. In the right breast 9:00 a 2.6 cm macro lobulated hypoechoic nodule was seen which correlated with the palpable lump. Cystocele the left breast. On May 5, 2020 she presented for right breast core biopsy and right breast 9:00 6 cm (twirl clip) from the nipple revealed a infiltrating duct carcinoma grade 2 (2nd op path at [facility] felt it was grade 3) with necrosis and papillary features, estrogen 0%-negative, progesterone 0%-negative, HER-2-negative by FISH and IHC, Ki-67 90%. \par \par She is s/p right axillary USGBx 6/1/2020 showed reactive lymph node and left breast 4N6 USGbx showed IDC, grade 9/9, TNBC Ki67 90%.  She completed chemo (ddACT) carboplatin was discontinued due to neuropathy 10/23/2020. She is s/p bilateral SSM and SLNE 11/19/2020 and no residual cancer; right 3 lymph nodes removed and left 2 all negative. Her mediport was removed 4/9/2021 and implants were placed.\par Her daughter joined via speakerphone and stated that the Quaker beliefs of the children prohibits them from obtaining genetic testing.\par \par She does NOT perform SBE. \par She has not noticed a change in her reconstruction breast.\par She has not noticed a change in her nipple or nipple area.\par She has not noticed a change in the skin of the breast.\par She is not experiencing nipple discharge.\par She is experiencing left breast pain at the site of the port. Worse at night, 6-7/10, better with position change/tylenol, she is currently having the pain but cannot describe the pain.\par She has not noticed a lump or lymph node under the armpit. \par \par BREAST CANCER RISK FACTORS\par Menarche: 17\par Date of LMP: 2013\par Menopause: post age 48\par Grav: 4     Para: 4\par Age at first live birth: 21\par Nursed: yes\par Hysterectomy: no\par Oophorectomy: no\par OCP: yes in the past \par HRT: no\par Last pap/pelvic exam: 2019 WNL \par Related family history: none\par Ashkenazi: no \par Mastery risk assessment: n/a\par BRCA testing: BRCA1 positive\par Bra size: \par \par Last mammogram: 2/27/2020                Location:  [place]\par Report reviewed.                                 Images reviewed on PACS\par Results: BIRADS 4\par heterogeneously dense breasts. no evidence of malignancy.\par \par Last ultrasound: 8/11/2020                    Location: \par Report reviewed.                                 Images reviewed on PACS\par Results: BIRADS 6\par marker decrease in size in the known mass in the right 9:00 axis, 6cm from the nipple. Previously seen mass in the left 4:00 axis is not identified on the current study. \par \par Last MRI: 11/3/2020                                 Location: [facility]\par Report reviewed.\par Results:BIRADS 6\par central outer right breast enhancement 3.7cmx1.9cmx2.5cm (was 4.6x3.0x4.8cm) spiculation/NME extends to nipple and posterior to the capsule and pectoralis. \par Left: susceptibility artifact from a biopsy marker in LOQ previously seen enhancing mass no longer visualized.

## 2023-01-09 NOTE — PHYSICAL EXAM
[Normocephalic] : normocephalic [Atraumatic] : atraumatic [Supple] : supple [No Supraclavicular Adenopathy] : no supraclavicular adenopathy [Examined in the supine and seated position] : examined in the supine and seated position [Symmetrical] : symmetrical [No dominant masses] : no dominant masses in right breast  [No dominant masses] : no dominant masses left breast [No Axillary Lymphadenopathy] : no left axillary lymphadenopathy [No Edema] : no edema [No Rashes] : no rashes [No Ulceration] : no ulceration [de-identified] : s/p SSMx and implant bilaterally, no masses, no collections, reconstructed nipple and tattooed, implants soft, in place [de-identified] : at site of patient's pain, IMF 6:00 there is keloid scar

## 2023-01-09 NOTE — ASSESSMENT
[FreeTextEntry1] : 56 yo female with TNBC bilaterally\par doing well\par \par cont derm surveillance\par rec PCP\par \par discussed MRI for left breast pain vs mammogram, she elects to have MRI-states she has claustrophobia\par \par We reviewed risk reduction strategies including maintaining a BMI <25, limiting red meat intake and alcoholic beverages to 3 per week and exercise (150 min/ week low intensity or 75 min/week high intensity). And maintaining a normal vitamin D level.\par she is also considering removing the implants, but will cont to discuss\par reviewed common etiologies of breast pain including caffeine, hormones and stress\par \par f/u 6 months\par \par She knows to call or return sooner should any concerns or questions arise.\par \par

## 2023-02-02 ENCOUNTER — RESULT REVIEW (OUTPATIENT)
Age: 58
End: 2023-02-02

## 2023-02-08 ENCOUNTER — RESULT REVIEW (OUTPATIENT)
Age: 58
End: 2023-02-08

## 2023-02-08 ENCOUNTER — APPOINTMENT (OUTPATIENT)
Dept: HEMATOLOGY ONCOLOGY | Facility: CLINIC | Age: 58
End: 2023-02-08
Payer: MEDICAID

## 2023-02-08 VITALS
RESPIRATION RATE: 18 BRPM | DIASTOLIC BLOOD PRESSURE: 73 MMHG | TEMPERATURE: 98.3 F | WEIGHT: 159 LBS | HEIGHT: 66 IN | SYSTOLIC BLOOD PRESSURE: 107 MMHG | HEART RATE: 87 BPM | OXYGEN SATURATION: 98 % | BODY MASS INDEX: 25.55 KG/M2

## 2023-02-08 PROCEDURE — 99213 OFFICE O/P EST LOW 20 MIN: CPT

## 2023-02-08 NOTE — HISTORY OF PRESENT ILLNESS
[T: ___] : T[unfilled] [N: ___] : N[unfilled] [de-identified] : This is a 59 y/o woman who is otherwise in excellent health, first noticed a "lump" in her breast back in November.  The mass has waxed and waned in size since then. \par The mass has become more painful,but no skin changes or nipple dc. Her last mammo was about 2 years ago. \par Mammogram done in feb 2020 showed a 2.6 cm mass.  Her diagnosis and biopsy was delayed due to covid. Biopsy done  may 5th showed a high grade triple negative ki 67 was 90 %. \par Patient has not had mri of breast or any systemic imaging or genetics \par Saw breast surgeon dr kumar and MRI as well as neoadjuvant chemo recommended \par \par Started ddAC  june 5, 2020 \par \par BRCA1 positive\par PETCT negative for mets \par echo is normal may 2020 \par \par biopsy of contralat breast is positive for triple negative breast \par \par 08/14/2020\par started carboplatin and taxol so far , no allergic reaction \par \par 8/11/20  ultrasound revealing for resolved  left mass and sig improved right breast mass now 1.6 cm) \par \par \par Carbo dc due to rapidly progressive neuropathy , (only received one dose) now on taxol weekly alone \par \par MRI reviewed shows iresolution on the left and on the right there is improvement in mass but persistant enhancement to pectoralis and extednign to nipple \par \par Bilateral mastectomy with implant reconstruction scheduled for 11/19/2020 with Rhoda Cottrell and Dinorah\par \par ultrasound ovaries ok \par will plan to do hysterotomy after complete breast surgery  [de-identified] :  Here today for follow up of breast cancer\par 2/3/23- ultrasound abdomen- normal liver. \par still trying to find new PCP. \par received steroid injection - Dr. Copeland for left inframammary keloid scar. \par up to date Ronit 1/9/23- c/o left breast pain ( has keloid scar)- ordered MRI - pt scheduled. \par pt scheduled for colonscopy 02/16/23. \par pt is not happy with her breasts and wants to see if she can remove implants- \par

## 2023-02-08 NOTE — REVIEW OF SYSTEMS
[Joint Pain] : joint pain [Joint Stiffness] : joint stiffness [Anxiety] : anxiety [Hot Flashes] : hot flashes [Recent Change In Weight] : ~T recent weight change [Negative] : Respiratory [Fever] : no fever [Chills] : no chills [Night Sweats] : no night sweats [Fatigue] : no fatigue [Shortness Of Breath] : no shortness of breath [Wheezing] : no wheezing [Cough] : no cough [SOB on Exertion] : no shortness of breath during exertion [Depression] : no depression [FreeTextEntry2] : intentionally trying to lose weight.  [FreeTextEntry9] : mild joint stiffness. not worse from prior  [de-identified] : see interval hx [de-identified] : neuropathy to distal fingertips. not worse.

## 2023-02-08 NOTE — REASON FOR VISIT
[Follow-Up Visit] : a follow-up [Other: ______] : provided by EMILIE [FreeTextEntry2] : Here for follow up of breast cancer  [FreeTextEntry3] : pt does not want pacific - prefers family to translate  [TWNoteComboBox1] : Bhutanese

## 2023-02-08 NOTE — ASSESSMENT
[FreeTextEntry1] : This is a 59 y/o woman with  new diagnosis of triple negative , high grade, breast cancer at least T2Nx. \par \par # breast cancer \par -s/p 4 cycles of AC neoadjuvant chemo , s/p carbo taxol x1 then completed 12 doses of taxol alone oct 2020 \par -Status post complete pathological response, 11/20/20 \par -Patient does not meet criteria for the create X trial as she has had a complete pathological response\par -markers negative repeat now \par - patient also does not really qualify for keytruda in adjuvant setting as she did not receive neoadjuvant , and futhermore she had a complete response \par -we also discussed lymparza, but again given complete response the role is unclear \par -according to [facility] , the only option post neoadjuvant with pCR is keytruda \par -repeat cea and ca 15-3 \par -referred to PT for occasional breast pain \par - pt follows Dr. Copeland for left inframammary keloid - s/p steroid injections. \par - up to date Dr. Cottrell- pt is scheduled for breast MRI . \par -no need for imaging \par \par # back pain \par resolved\par petct neg 2020\par \par # vit d def \par cont vit d 2000 units a day \par offered bone density- pt would like to hold off at this time . she will think about it. \par \par # BRCA 1 positive - \par Status post bilateral mastectomy\par s/p hystectomy , s/p  tv ultrasound for dc , s/p gyn eval , all negative \par repeat ca 125 and ca 19-9 today  \par pt scheduled for colonscopy 02/16/23. discussed the risk of colon cancer is hgher with BRCA Pos . pt curently denies GI sxs. \par encouraged her to see a dermatologist and gyn yearly.\par mri of abd negative for any pancreatic lesions April 2022 , dw patient - recent abd ultrasound 02/03/23 unable to visualize pancreas. \par \par #) neuropathy \par likely due to chemo \par not worse \par \par #) insomnia \par Ativan prn , medical cannibis prn \par \par #) varicose veins \par vascular referral\par \par #) ALK P elevated from nov 2022\par GGT elevated. \par feb 2023 abdominal ultrasound normal liver. \par pt currently in process of finding new PCP. \par \par dw patient at length. pt daughter ( Juanis) utilized for . pt prefers family to translate. \par Follow-up in 3-4 months

## 2023-02-08 NOTE — PHYSICAL EXAM
[Normal] : normal spine exam without palpable tenderness, no kyphosis or scoliosis [de-identified] : BLE with small varicosities. no tortuous veins. no BLE swelling  [de-identified] : b/l breast slightly asymmetrical. R breast hangs lower than left breast. Bilateral mastectomy with implants in place. no masses or nodules palpable no axillary adenopathy., + keloid bilat [de-identified] : keloids and varicosities  [de-identified] : + sensory

## 2023-02-09 LAB
CANCER AG125 SERPL-ACNC: 5 U/ML
CANCER AG15-3 SERPL-ACNC: 15.6 U/ML
CANCER AG19-9 SERPL-ACNC: <2 U/ML
CEA SERPL-MCNC: 1.2 NG/ML

## 2023-06-06 ENCOUNTER — APPOINTMENT (OUTPATIENT)
Dept: HEMATOLOGY ONCOLOGY | Facility: CLINIC | Age: 58
End: 2023-06-06
Payer: MEDICAID

## 2023-06-06 ENCOUNTER — RESULT REVIEW (OUTPATIENT)
Age: 58
End: 2023-06-06

## 2023-06-06 VITALS
HEART RATE: 82 BPM | DIASTOLIC BLOOD PRESSURE: 82 MMHG | SYSTOLIC BLOOD PRESSURE: 128 MMHG | RESPIRATION RATE: 18 BRPM | BODY MASS INDEX: 25.55 KG/M2 | OXYGEN SATURATION: 99 % | TEMPERATURE: 98.1 F | HEIGHT: 66 IN | WEIGHT: 159 LBS

## 2023-06-06 PROCEDURE — 99214 OFFICE O/P EST MOD 30 MIN: CPT

## 2023-06-07 LAB
25(OH)D3 SERPL-MCNC: 52.7 NG/ML
CANCER AG125 SERPL-ACNC: 6 U/ML
CANCER AG15-3 SERPL-ACNC: 16.1 U/ML
CANCER AG19-9 SERPL-ACNC: <2 U/ML
CEA SERPL-MCNC: 1.4 NG/ML
FERRITIN SERPL-MCNC: 80 NG/ML
FOLATE SERPL-MCNC: 17.6 NG/ML
IRON SATN MFR SERPL: 30 %
IRON SERPL-MCNC: 100 UG/DL
TIBC SERPL-MCNC: 335 UG/DL
UIBC SERPL-MCNC: 235 UG/DL
VIT B12 SERPL-MCNC: 781 PG/ML

## 2023-06-08 NOTE — HISTORY OF PRESENT ILLNESS
[T: ___] : T[unfilled] [N: ___] : N[unfilled] [de-identified] : This is a 57 y/o woman who is otherwise in excellent health, first noticed a "lump" in her breast back in November.  The mass has waxed and waned in size since then. \par The mass has become more painful,but no skin changes or nipple dc. Her last mammo was about 2 years ago. \par Mammogram done in feb 2020 showed a 2.6 cm mass.  Her diagnosis and biopsy was delayed due to covid. Biopsy done  may 5th showed a high grade triple negative ki 67 was 90 %. \par Patient has not had mri of breast or any systemic imaging or genetics \par Saw breast surgeon dr kumar and MRI as well as neoadjuvant chemo recommended \par \par Started ddAC  june 5, 2020 \par \par BRCA1 positive\par PETCT negative for mets \par echo is normal may 2020 \par \par biopsy of contralat breast is positive for triple negative breast \par \par 08/14/2020\par started carboplatin and taxol so far , no allergic reaction \par \par 8/11/20  ultrasound revealing for resolved  left mass and sig improved right breast mass now 1.6 cm) \par \par \par Carbo dc due to rapidly progressive neuropathy , (only received one dose) now on taxol weekly alone \par \par MRI reviewed shows iresolution on the left and on the right there is improvement in mass but persistant enhancement to pectoralis and extednign to nipple \par \par Bilateral mastectomy with implant reconstruction scheduled for 11/19/2020 with Rhoda Cottrell and Dinorah\par \par ultrasound ovaries ok \par will plan to do hysterotomy after complete breast surgery  [de-identified] :  Here today for follow up of breast cancer\par received steroid injection - Dr. Copeland for left inframammary keloid scar. not happy with her implants \par up to date Ronit 23- c/o left breast pain ( has keloid scar)- has follow up 2023. \par up to date [facility]- per patient normal. 10 year repeat. \par pt is not happy with her breasts and wants to see if she can remove implants- \par \par 2023 Abd ultrasound- normal. unable to visualize pancreas \par cannibis . needs renewal. helps her sleep\par tingling to distal extremities BUE/BLE.\par trouble sleeping. \par dizzy spells. sometimes worse with blurry vision. \par

## 2023-06-08 NOTE — REVIEW OF SYSTEMS
[Joint Pain] : joint pain [Joint Stiffness] : joint stiffness [Anxiety] : anxiety [Hot Flashes] : hot flashes [Negative] : Allergic/Immunologic [Fever] : no fever [Chills] : no chills [Night Sweats] : no night sweats [Fatigue] : fatigue [Recent Change In Weight] : ~T no recent weight change [Eye Pain] : no eye pain [Red Eyes] : eyes not red [Dry Eyes] : no dryness of the eyes [Vision Problems] : no vision problems [Shortness Of Breath] : no shortness of breath [Wheezing] : no wheezing [Cough] : no cough [SOB on Exertion] : no shortness of breath during exertion [Insomnia] : insomnia [Depression] : no depression [FreeTextEntry2] : intentionally trying to lose weight. weight stable at this time [FreeTextEntry3] :  blurry vision [FreeTextEntry9] : mild joint stiffness. not worse from prior  [de-identified] : see interval hx. also has fleeting twinges to left breast when she lays on left side.  no active pain nor burning sensationt obreast today [de-identified] : neuropathy to distal fingertips. not worse.

## 2023-06-08 NOTE — ASSESSMENT
[FreeTextEntry1] : This is a 59 y/o woman with  new diagnosis of triple negative , high grade, breast cancer at least T2Nx. \par \par # breast cancer \par -s/p 4 cycles of AC neoadjuvant chemo , s/p carbo taxol x1 then completed 12 doses of taxol alone oct 2020 \par -Status post complete pathological response, 11/20/20 \par -Patient does not meet criteria for the create X trial as she has had a complete pathological response\par -markers negative repeat now \par - patient also does not really qualify for keytruda in adjuvant setting as she did not receive neoadjuvant , and futhermore she had a complete response \par -we also discussed lymparza, but again given complete response the role is unclear \par -according to NCCN , the only option post neoadjuvant with pCR is keytruda \par -repeat cea , ca 15-3, ca 19-9, ca 125 today\par -referred to PT for occasional breast pain \par - pt follows Dr. Copeland for left inframammary keloid - s/p steroid injections. she is unhappy with her implants. things it is contributing to her dizziness. I recommended that she follow back up with Dr. Copeland \par - pt is scheduled for breast MRI . has follow up july 2023 with Dr. Cottrell \par \par # back pain \par resolved\par petct neg 2020\par \par # vit d def \par cont vit d 2000 units a day \par offered bone density- pt would like to hold off at this time . she will think about it. \par \par # BRCA 1 positive - \par Status post bilateral mastectomy\par s/p hystectomy , s/p  tv ultrasound for dc , s/p gyn eval , all negative \par repeat tumor markers today ( cea, ca 15-3, ca 19-9, ca 125)\par up to date [facility] 2023- per patient was normal. [id number]. \par encouraged her to see a dermatologist and gyn yearly.\par mri of abd negative for any pancreatic lesions April 2022 , dw patient - recent abd ultrasound 02/03/23 unable to visualize pancreas. denies any abdominal pain. \par \par #) neuropathy \par likely due to chemo \par persistent but not worse \par check b12/folate today\par \par #) insomnia \par Ativan prn , medical cannibis prn \par advised palliative for medical cannibis renewal. \par \par #) ALK P elevated from nov 2022\par GGT elevated. \par feb 2023 abdominal ultrasound normal liver. \par \par #) dizziness \par pt noticed it during chemo. now sometimes worse with blurry vision. \par may be r/t to Medical cannibis \par will check brain MRI to r/o mets \par check ferritin and iron studies. \par \par dw patient at length. pt daughter ( Juanis) utilized for . pt prefers family to translate. \par Follow-up in 3-4 months

## 2023-06-08 NOTE — REASON FOR VISIT
[Follow-Up Visit] : a follow-up [Other: ______] : provided by EMILIE [FreeTextEntry2] : Here for follow up of breast cancer  [FreeTextEntry3] : pt does not want pacific - prefers family to translate  [TWNoteComboBox1] : Indonesian

## 2023-06-08 NOTE — PHYSICAL EXAM
[Normal] : affect appropriate [de-identified] : BLE with small varicosities. no tortuous veins. no BLE swelling  [de-identified] : b/l breast slightly asymmetrical. R breast hangs lower than left breast. Bilateral mastectomy with implants in place. no masses or nodules palpable no axillary adenopathy., + keloid bilat [de-identified] : keloids and varicosities  [de-identified] : + sensory

## 2023-07-13 ENCOUNTER — NON-APPOINTMENT (OUTPATIENT)
Age: 58
End: 2023-07-13

## 2023-07-14 ENCOUNTER — APPOINTMENT (OUTPATIENT)
Dept: GERIATRICS | Facility: CLINIC | Age: 58
End: 2023-07-14
Payer: MEDICARE

## 2023-07-14 VITALS
TEMPERATURE: 98.2 F | BODY MASS INDEX: 24.91 KG/M2 | DIASTOLIC BLOOD PRESSURE: 68 MMHG | WEIGHT: 155 LBS | OXYGEN SATURATION: 100 % | HEIGHT: 66 IN | SYSTOLIC BLOOD PRESSURE: 107 MMHG | HEART RATE: 68 BPM

## 2023-07-14 DIAGNOSIS — R53.83 OTHER FATIGUE: ICD-10-CM

## 2023-07-14 DIAGNOSIS — Z51.5 ENCOUNTER FOR PALLIATIVE CARE: ICD-10-CM

## 2023-07-14 DIAGNOSIS — G62.9 POLYNEUROPATHY, UNSPECIFIED: ICD-10-CM

## 2023-07-14 PROCEDURE — 99204 OFFICE O/P NEW MOD 45 MIN: CPT

## 2023-07-14 NOTE — PHYSICAL EXAM
[General Appearance - Alert] : alert [General Appearance - In No Acute Distress] : in no acute distress [Sclera] : the sclera and conjunctiva were normal [Normal Oral Mucosa] : normal oral mucosa [] : no respiratory distress [Oriented To Time, Place, And Person] : oriented to person, place, and time

## 2023-07-14 NOTE — HISTORY OF PRESENT ILLNESS
[FreeTextEntry1] : Meagan Rosen is a 57 y/o F w/ high grade triple negative breast CA stage s/p b/l mastectomy, carbo/taxol x 1, then taxol x 12 doses w/ complete pathological response who was referred by oncology for medical cannabis renewal. She is accompanied by her daughter, Zainab. She is declining a  and requesting for her daughter to translate. \par \par 7/14 (initial eval): Pt reports the following symptoms: \par \par Not sleeping due to breast pain \par Can't drive due to numbness in feet- she used to be a bus drivcer, so consequently, cannot work\par Sometimes spends two days in bed- is tired and feels like feet are swollen. Feels a little "low"- doesn't like the word "depressed."\par Pain in breast\par Numbness in hands and sleep\par Not napping during the day, but is laying around a lot. \par \par Just takes Tylenol for pain\par Sometimes take 1/2 cannabis gummy to help with pain and sleep. \par Appetite is ok. \par Regular BMs\par \par \par Former  for [employer]\par Loves to make her own natural products, go for walks\par Lives with daughter Zainab.

## 2023-07-17 ENCOUNTER — APPOINTMENT (OUTPATIENT)
Dept: BREAST CENTER | Facility: CLINIC | Age: 58
End: 2023-07-17
Payer: MEDICARE

## 2023-07-17 ENCOUNTER — NON-APPOINTMENT (OUTPATIENT)
Age: 58
End: 2023-07-17

## 2023-07-17 VITALS
HEART RATE: 73 BPM | SYSTOLIC BLOOD PRESSURE: 131 MMHG | HEIGHT: 66 IN | DIASTOLIC BLOOD PRESSURE: 85 MMHG | BODY MASS INDEX: 24.91 KG/M2 | WEIGHT: 155 LBS

## 2023-07-17 DIAGNOSIS — R07.89 OTHER CHEST PAIN: ICD-10-CM

## 2023-07-17 PROCEDURE — 99215 OFFICE O/P EST HI 40 MIN: CPT

## 2023-07-17 NOTE — PHYSICAL EXAM
[Normocephalic] : normocephalic [Atraumatic] : atraumatic [Supple] : supple [No Supraclavicular Adenopathy] : no supraclavicular adenopathy [Examined in the supine and seated position] : examined in the supine and seated position [Symmetrical] : symmetrical [No dominant masses] : no dominant masses in right breast  [No dominant masses] : no dominant masses left breast [No Axillary Lymphadenopathy] : no left axillary lymphadenopathy [No Edema] : no edema [No Rashes] : no rashes [No Ulceration] : no ulceration [de-identified] : s/p SSMx and implant bilaterally, no masses, no collections, reconstructed nipple and tattooed, implants soft, in place [de-identified] : at site of patient's pain, IMF 6:00 there is keloid scar

## 2023-07-17 NOTE — ASSESSMENT
[FreeTextEntry1] : 57 yo female with TNBC bilaterally\par doing well\par for left breast pain rec breast MRI, she will do asap\par cont derm surveillance\par rec PCP\par \par We reviewed risk reduction strategies including maintaining a BMI <25, limiting red meat intake and alcoholic beverages to 3 per week and exercise (150 min/ week low intensity or 75 min/week high intensity). And maintaining a normal vitamin D level.\par she is also considering removing the implants, but will cont to discuss\par reviewed common etiologies of breast pain including caffeine, hormones and stress\par \par f/u 6 months\par son Redd joined via speaker\par \par She knows to call or return sooner should any concerns or questions arise.\par \par

## 2023-07-17 NOTE — HISTORY OF PRESENT ILLNESS
[FreeTextEntry1] : This is a 58 year old female referred by Dr. Shen for a newly diagnosed right breast cancer. She states she felt a lump since November 2019. It was painful with pressure.\par \par Her last imaging was 2 years ago in [place] and she doesn't remember where.She started garlic, oregano supplements, essro, vitamin e and super primose supplements since her diagnosis. She has bilateral retropectoral silicone implants which were placed in [facility] [place]. \par \par On February 27, 2020 she presented for imaging for the right breast mass. In the right breast 9:00 a 2.6 cm macro lobulated hypoechoic nodule was seen which correlated with the palpable lump. Cystocele the left breast. On May 5, 2020 she presented for right breast core biopsy and right breast 9:00 6 cm (twirl clip) from the nipple revealed a infiltrating duct carcinoma grade 2 (2nd op path at [facility] felt it was grade 3) with necrosis and papillary features, estrogen 0%-negative, progesterone 0%-negative, HER-2-negative by FISH and IHC, Ki-67 90%. \par \par She is s/p right axillary USGBx 6/1/2020 showed reactive lymph node and left breast 4N6 USGbx showed IDC, grade 9/9, TNBC Ki67 90%.  She completed chemo (ddACT) carboplatin was discontinued due to neuropathy 10/23/2020. She is s/p bilateral SSM and SLNE 11/19/2020 and no residual cancer; right 3 lymph nodes removed and left 2 all negative. Her mediport was removed 4/9/2021 and implants were placed.\par \par Her daughter joined via speakerphone and stated that the Anabaptist beliefs of the children prohibits them from obtaining genetic testing.\par \par She does NOT perform SBE. \par She has not noticed a change in her reconstruction breast.\par She has not noticed a change in her nipple or nipple area.\par She has not noticed a change in the skin of the breast.\par She is not experiencing nipple discharge.\par She is experiencing left breast pain at the site of the port. Worse at night, 6-7/10, better with position change/tylenol, she is currently having the pain but cannot describe the pain.\par She has not noticed a lump or lymph node under the armpit. \par \par BREAST CANCER RISK FACTORS\par Menarche: 17\par Date of LMP: 2013\par Menopause: post age 48\par Grav: 4     Para: 4\par Age at first live birth: 21\par Nursed: yes\par Hysterectomy: no\par Oophorectomy: no\par OCP: yes in the past \par HRT: no\par Last pap/pelvic exam: 2019 WNL \par Related family history: none\par Ashkenazi: no \par Mastery risk assessment: n/a\par BRCA testing: BRCA1 positive\par Bra size: \par \par Last mammogram: 2/27/2020                Location:  [facility]\par Report reviewed.                                 Images reviewed on PACS\par Results: BIRADS 4\par heterogeneously dense breasts. no evidence of malignancy.\par \par Last ultrasound: 8/11/2020                    Location: \par Report reviewed.                                 Images reviewed on PACS\par Results: BIRADS 6\par marker decrease in size in the known mass in the right 9:00 axis, 6cm from the nipple. Previously seen mass in the left 4:00 axis is not identified on the current study. \par \par Last MRI: 11/3/2020                                 Location: [facility]\par Report reviewed.\par Results:BIRADS 6\par central outer right breast enhancement 3.7cmx1.9cmx2.5cm (was 4.6x3.0x4.8cm) spiculation/NME extends to nipple and posterior to the capsule and pectoralis. \par Left: susceptibility artifact from a biopsy marker in LOQ previously seen enhancing mass no longer visualized.

## 2023-08-03 ENCOUNTER — RESULT REVIEW (OUTPATIENT)
Age: 58
End: 2023-08-03

## 2023-11-02 DIAGNOSIS — Z17.1 MALIGNANT NEOPLASM OF OVERLAPPING SITES OF RIGHT FEMALE BREAST: ICD-10-CM

## 2023-11-02 DIAGNOSIS — C50.811 MALIGNANT NEOPLASM OF OVERLAPPING SITES OF RIGHT FEMALE BREAST: ICD-10-CM

## 2023-11-02 DIAGNOSIS — E53.8 DEFICIENCY OF OTHER SPECIFIED B GROUP VITAMINS: ICD-10-CM

## 2023-11-02 DIAGNOSIS — E55.9 VITAMIN D DEFICIENCY, UNSPECIFIED: ICD-10-CM

## 2023-11-02 DIAGNOSIS — C50.911 MALIGNANT NEOPLASM OF UNSPECIFIED SITE OF RIGHT FEMALE BREAST: ICD-10-CM

## 2023-11-07 ENCOUNTER — LABORATORY RESULT (OUTPATIENT)
Age: 58
End: 2023-11-07

## 2023-11-07 ENCOUNTER — RESULT REVIEW (OUTPATIENT)
Age: 58
End: 2023-11-07

## 2023-11-07 ENCOUNTER — APPOINTMENT (OUTPATIENT)
Dept: HEMATOLOGY ONCOLOGY | Facility: CLINIC | Age: 58
End: 2023-11-07
Payer: MEDICARE

## 2023-11-07 VITALS
SYSTOLIC BLOOD PRESSURE: 117 MMHG | WEIGHT: 161.31 LBS | RESPIRATION RATE: 16 BRPM | BODY MASS INDEX: 25.92 KG/M2 | HEIGHT: 66 IN | TEMPERATURE: 97.6 F | HEART RATE: 71 BPM | OXYGEN SATURATION: 99 % | DIASTOLIC BLOOD PRESSURE: 75 MMHG

## 2023-11-07 DIAGNOSIS — R42 DIZZINESS AND GIDDINESS: ICD-10-CM

## 2023-11-07 DIAGNOSIS — C50.919 MALIGNANT NEOPLASM OF UNSPECIFIED SITE OF UNSPECIFIED FEMALE BREAST: ICD-10-CM

## 2023-11-07 DIAGNOSIS — H53.9 UNSPECIFIED VISUAL DISTURBANCE: ICD-10-CM

## 2023-11-07 PROCEDURE — 99214 OFFICE O/P EST MOD 30 MIN: CPT

## 2024-02-22 NOTE — PHYSICAL EXAM
[Normocephalic] : normocephalic [Atraumatic] : atraumatic [Supple] : supple [No Supraclavicular Adenopathy] : no supraclavicular adenopathy [Examined in the supine and seated position] : examined in the supine and seated position [Symmetrical] : symmetrical [No dominant masses] : no dominant masses in right breast  [No dominant masses] : no dominant masses left breast [No Axillary Lymphadenopathy] : no left axillary lymphadenopathy [No Edema] : no edema [No Ulceration] : no ulceration [No Rashes] : no rashes [de-identified] : at site of patient's pain, IMF 6:00 there is keloid scar [de-identified] : s/p SSMx and implant bilaterally, no masses, no collections, reconstructed nipple and tattooed, implants soft, in place

## 2024-02-22 NOTE — HISTORY OF PRESENT ILLNESS
[FreeTextEntry1] : This is a 59 year old female referred by Dr. Shen for a newly diagnosed right breast cancer. She states she felt a lump since November 2019. It was painful with pressure.  Her last imaging was 2 years ago in [place] and she doesn't remember where.She started garlic, oregano supplements, essro, vitamin e and super primose supplements since her diagnosis. She has bilateral retropectoral silicone implants which were placed in [facility] [place].   On February 27, 2020 she presented for imaging for the right breast mass. In the right breast 9:00 a 2.6 cm macro lobulated hypoechoic nodule was seen which correlated with the palpable lump. Cystocele the left breast. On May 5, 2020 she presented for right breast core biopsy and right breast 9:00 6 cm (twirl clip) from the nipple revealed a infiltrating duct carcinoma grade 2 (2nd op path at [facility] felt it was grade 3) with necrosis and papillary features, estrogen 0%-negative, progesterone 0%-negative, HER-2-negative by FISH and IHC, Ki-67 90%.   She is s/p right axillary USGBx 6/1/2020 showed reactive lymph node and left breast 4N6 USGbx showed IDC, grade 9/9, TNBC Ki67 90%.  She completed chemo (ddACT) carboplatin was discontinued due to neuropathy 10/23/2020. She is s/p bilateral SSM and SLNE 11/19/2020 and no residual cancer; right 3 lymph nodes removed and left 2 all negative. Her mediport was removed 4/9/2021 and implants were placed.  Her daughter joined via speakerphone and stated that the Mormon beliefs of the children prohibits them from obtaining genetic testing.  She does NOT perform SBE.  She has not noticed a change in her reconstruction breast. She has not noticed a change in her nipple or nipple area. She has not noticed a change in the skin of the breast. She is not experiencing nipple discharge. She is experiencing left breast pain at the site of the port. Worse at night, 6-7/10, better with position change/tylenol, she is currently having the pain but cannot describe the pain. She has not noticed a lump or lymph node under the armpit.   BREAST CANCER RISK FACTORS Menarche: 17 Date of LMP: 2013 Menopause: post age 48 Grav: 4     Para: 4 Age at first live birth: 21 Nursed: yes Hysterectomy: no Oophorectomy: no OCP: yes in the past  HRT: no Last pap/pelvic exam: 2019 WNL  Related family history: none Ashkenazi: no  Mastery risk assessment: n/a BRCA testing: BRCA1 positive Bra size:   Last mammogram: 2/27/2020                Location:  [facility] Report reviewed.                                 Images reviewed on PACS Results: BIRADS 4 heterogeneously dense breasts. no evidence of malignancy.  Last ultrasound: 8/11/2020                    Location: [facility] Report reviewed.                                 Images reviewed on PACS Results: BIRADS 6 marker decrease in size in the known mass in the right 9:00 axis, 6cm from the nipple. Previously seen mass in the left 4:00 axis is not identified on the current study.   Last MRI: 08/04/2023                             Location: [facility] Report reviewed. Results:BIRADS 2 Status post bilateral mastectomies and implant reconstruction. The reconstructed breasts demonstrate minimal background parenchymal enhancement. Bilateral prepectoral silicone implants noted in situ.

## 2024-02-26 ENCOUNTER — APPOINTMENT (OUTPATIENT)
Dept: BREAST CENTER | Facility: CLINIC | Age: 59
End: 2024-02-26

## 2024-04-16 ENCOUNTER — NON-APPOINTMENT (OUTPATIENT)
Age: 59
End: 2024-04-16

## 2024-04-16 LAB — GGT SERPL-CCNC: 52 U/L

## 2024-05-07 ENCOUNTER — LABORATORY RESULT (OUTPATIENT)
Age: 59
End: 2024-05-07

## 2024-05-07 ENCOUNTER — RESULT REVIEW (OUTPATIENT)
Age: 59
End: 2024-05-07

## 2024-05-07 ENCOUNTER — APPOINTMENT (OUTPATIENT)
Dept: HEMATOLOGY ONCOLOGY | Facility: CLINIC | Age: 59
End: 2024-05-07
Payer: MEDICARE

## 2024-05-07 VITALS
WEIGHT: 165 LBS | BODY MASS INDEX: 26.52 KG/M2 | DIASTOLIC BLOOD PRESSURE: 79 MMHG | SYSTOLIC BLOOD PRESSURE: 142 MMHG | OXYGEN SATURATION: 96 % | TEMPERATURE: 97.9 F | HEIGHT: 66 IN | RESPIRATION RATE: 16 BRPM | HEART RATE: 69 BPM

## 2024-05-07 DIAGNOSIS — C50.512 MALIGNANT NEOPLASM OF LOWER-OUTER QUADRANT OF LEFT FEMALE BREAST: ICD-10-CM

## 2024-05-07 DIAGNOSIS — Z15.01 GENETIC SUSCEPTIBILITY TO MALIGNANT NEOPLASM OF BREAST: ICD-10-CM

## 2024-05-07 DIAGNOSIS — Z98.82 BREAST IMPLANT STATUS: ICD-10-CM

## 2024-05-07 DIAGNOSIS — Z17.1 MALIGNANT NEOPLASM OF LOWER-OUTER QUADRANT OF LEFT FEMALE BREAST: ICD-10-CM

## 2024-05-07 DIAGNOSIS — Z15.09 GENETIC SUSCEPTIBILITY TO MALIGNANT NEOPLASM OF BREAST: ICD-10-CM

## 2024-05-07 PROCEDURE — 99215 OFFICE O/P EST HI 40 MIN: CPT

## 2024-05-07 NOTE — PHYSICAL EXAM
[Normal] : affect appropriate [de-identified] : BLE with small varicosities. no tortuous veins. no BLE swelling  [de-identified] : b/l breast slightly asymmetrical. R breast hangs lower than left breast. Bilateral mastectomy with implants in place. no masses or nodules palpable no axillary adenopathy., + keloid bilat [de-identified] : keloids and varicosities  [de-identified] : + sensory

## 2024-05-07 NOTE — REVIEW OF SYSTEMS
[Fatigue] : fatigue [Joint Pain] : joint pain [Joint Stiffness] : joint stiffness [Insomnia] : insomnia [Anxiety] : anxiety [Hot Flashes] : hot flashes [Negative] : Allergic/Immunologic [Fever] : no fever [Chills] : no chills [Night Sweats] : no night sweats [Recent Change In Weight] : ~T no recent weight change [Eye Pain] : no eye pain [Red Eyes] : eyes not red [Dry Eyes] : no dryness of the eyes [Vision Problems] : no vision problems [Shortness Of Breath] : no shortness of breath [Wheezing] : no wheezing [Cough] : no cough [SOB on Exertion] : no shortness of breath during exertion [Depression] : no depression [FreeTextEntry2] : intentionally trying to lose weight. weight stable at this time [FreeTextEntry3] :  blurry vision [FreeTextEntry9] : mild joint stiffness. not worse from prior  [de-identified] : see interval hx. also has fleeting twinges to left breast when she lays on left side.  no active pain nor burning sensationt obreast today [de-identified] : neuropathy to distal fingertips. not worse.

## 2024-05-07 NOTE — REASON FOR VISIT
[Follow-Up Visit] : a follow-up [Other: ______] : provided by EMILIE [FreeTextEntry2] : Here for follow up of breast cancer  [FreeTextEntry3] : pt does not want pacific - prefers family to translate  [TWNoteComboBox1] : Chinese

## 2024-05-07 NOTE — ASSESSMENT
[FreeTextEntry1] : This is a 59 y/o woman with new diagnosis of triple negative , high grade, breast cancer at least T2Nx  # breast cancer -s/p 4 cycles of AC neoadjuvant chemo , s/p carbo taxol x1 then completed 12 doses of taxol alone oct 2020 -Status post complete pathological response, 11/20/20 -repeat cea , ca 15-3, ca 19-9, ca 125 today - r breast MRI - Reviewed 8/23 - no evidence of malignancy. - she is concerned about pinching of implants - check MRI breast -needs to follow up with Dr. Cottrell  # vit d def cont vit d 2000 units a day needs DEXA - refused previously  # BRCA 1 positive - Status post bilateral mastectomy s/p hysteretomy , s/p tv ultrasound for dc , s/p gyn eval , all negative repeat tumor markers today ( cea, ca 15-3, ca 19-9, ca 125) up to date CNY 2023- per patient was normal. [id number]. encouraged her to see a dermatologist and gyn yearly. mri of abd negative for any pancreatic lesions April 2022 , dw patient - recent abd ultrasound 02/03/23 unable to visualize pancreas. denies any abdominal pain. repeat MRI Abd - attn to pancreas  #) neuropathy likely due to chemo persistent but not worse check b12/folate today  #) insomnia Ativan prn , medical cannibis prn advised palliative for medical cannibis renewal.  #) ALK P elevated from nov 2022 GGT elevated. feb 2023 abdominal ultrasound normal liver.  #) dizziness MRI brain    Follow-up in 6 months cbc with diff, cmp, cea, ca 15-3, ca 19-9, ca 125, vit D

## 2024-05-07 NOTE — HISTORY OF PRESENT ILLNESS
[T: ___] : T[unfilled] [N: ___] : N[unfilled] [de-identified] : This is a 57 y/o woman who is otherwise in excellent health, first noticed a "lump" in her breast back in November.  The mass has waxed and waned in size since then. \par  The mass has become more painful,but no skin changes or nipple dc. Her last mammo was about 2 years ago. \par  Mammogram done in feb 2020 showed a 2.6 cm mass.  Her diagnosis and biopsy was delayed due to covid. Biopsy done  may 5th showed a high grade triple negative ki 67 was 90 %. \par  Patient has not had mri of breast or any systemic imaging or genetics \par  Saw breast surgeon dr kumar and MRI as well as neoadjuvant chemo recommended \par  \par  Started ddAC  june 5, 2020 \par  \par  BRCA1 positive\par  PETCT negative for mets \par  echo is normal may 2020 \par  \par  biopsy of contralat breast is positive for triple negative breast \par  \par  08/14/2020\par  started carboplatin and taxol so far , no allergic reaction \par  \par  8/11/20  ultrasound revealing for resolved  left mass and sig improved right breast mass now 1.6 cm) \par  \par  \par  Carbo dc due to rapidly progressive neuropathy , (only received one dose) now on taxol weekly alone \par  \par  MRI reviewed shows iresolution on the left and on the right there is improvement in mass but persistant enhancement to pectoralis and extednign to nipple \par  \par  Bilateral mastectomy with implant reconstruction scheduled for 11/19/2020 with Rhoda Cottrell and Dinorah\par  \par  ultrasound ovaries ok \par  will plan to do hysterotomy after complete breast surgery  [de-identified] : Patient presents today for follow up.  complains of dizziness and worried about implants - feels pinching sensation  MAMMO: 5/2020 MRI: 08/2023 up to date CNY- 2023 - due in 2033 feb 2023 Abd ultrasound- normal. unable to visualize pancreas

## 2024-07-19 ENCOUNTER — TRANSCRIPTION ENCOUNTER (OUTPATIENT)
Age: 59
End: 2024-07-19

## 2024-10-21 NOTE — HISTORY OF PRESENT ILLNESS
[T: ___] : T[unfilled] [N: ___] : N[unfilled] [de-identified] : This is a 56 y/o woman who is otherwise in excellent health, first noticed a "lump" in her breast back in November.  The mass has waxed and waned in size since then. \par The mass has become more painful,but no skin changes or nipple dc. Her last mammo was about 2 years ago. \par Mammogram done in feb 2020 showed a 2.6 cm mass.  Her diagnosis and biopsy was delayed due to covid. Biopsy done  may 5th showed a high grade triple negative ki 67 was 90 %. 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

## 2024-11-21 ENCOUNTER — APPOINTMENT (OUTPATIENT)
Dept: HEMATOLOGY ONCOLOGY | Facility: CLINIC | Age: 59
End: 2024-11-21

## 2024-12-19 ENCOUNTER — NON-APPOINTMENT (OUTPATIENT)
Age: 59
End: 2024-12-19

## 2025-01-21 PROCEDURE — 99215 OFFICE O/P EST HI 40 MIN: CPT
